# Patient Record
Sex: MALE | Race: WHITE | NOT HISPANIC OR LATINO | Employment: OTHER | URBAN - METROPOLITAN AREA
[De-identification: names, ages, dates, MRNs, and addresses within clinical notes are randomized per-mention and may not be internally consistent; named-entity substitution may affect disease eponyms.]

---

## 2019-11-22 ENCOUNTER — HOSPITAL ENCOUNTER (EMERGENCY)
Facility: CLINIC | Age: 74
Discharge: HOME | End: 2019-11-22
Attending: EMERGENCY MEDICINE | Admitting: EMERGENCY MEDICINE
Payer: COMMERCIAL

## 2019-11-22 ENCOUNTER — APPOINTMENT (OUTPATIENT)
Dept: GENERAL RADIOLOGY | Facility: CLINIC | Age: 74
End: 2019-11-22
Attending: EMERGENCY MEDICINE
Payer: COMMERCIAL

## 2019-11-22 VITALS
HEART RATE: 62 BPM | BODY MASS INDEX: 27.2 KG/M2 | HEIGHT: 70 IN | OXYGEN SATURATION: 98 % | WEIGHT: 190 LBS | SYSTOLIC BLOOD PRESSURE: 130 MMHG | RESPIRATION RATE: 18 BRPM | TEMPERATURE: 97 F | DIASTOLIC BLOOD PRESSURE: 83 MMHG

## 2019-11-22 DIAGNOSIS — K59.00 CONSTIPATION, UNSPECIFIED CONSTIPATION TYPE: ICD-10-CM

## 2019-11-22 LAB — HEMOCCULT STL QL: NEGATIVE

## 2019-11-22 PROCEDURE — 99284 EMERGENCY DEPT VISIT MOD MDM: CPT

## 2019-11-22 PROCEDURE — 74019 RADEX ABDOMEN 2 VIEWS: CPT

## 2019-11-22 PROCEDURE — 82272 OCCULT BLD FECES 1-3 TESTS: CPT | Performed by: EMERGENCY MEDICINE

## 2019-11-22 ASSESSMENT — MIFFLIN-ST. JEOR: SCORE: 1603.08

## 2019-11-22 ASSESSMENT — ENCOUNTER SYMPTOMS
VOMITING: 0
NAUSEA: 1
ABDOMINAL DISTENTION: 1
CONSTIPATION: 1
HEADACHES: 1
ABDOMINAL PAIN: 1
BACK PAIN: 1
FEVER: 0

## 2019-11-22 NOTE — ED PROVIDER NOTES
History     Chief Complaint:  Abdominal Pain    HPI   Elvis Abad is a 74 year old male who presents with abdominal pain. He reports that he started having right sided abdominal pain and back pain 5 days ago.  He states that he had a few small bowel movements during the day 3 and 4 days ago and was not concerned about constipation at that time. He denies blood in his stool but states that he noticed a red chunk about the size of his thumb that he thought was red pepper from some stir-sheriff he ate. He reports that he was reassured by his somewhat regular bowel movements and was more concerned about his gallbladder at this time. Per his chart, he visited his primary care provider 3 days ago and received a workup including labs and imaging and was referred for an abdominal ultrasound at [facility] yesterday (see results below).  He states that he has not had a bowel movement in the last 2 days but has been able to pass gas. He states that he takes Miralax daily and that this has not provided relief of his constipation. He states that he has experienced headache, nausea, abdominal bloating, and frequent belching. He denies vomiting and fever. He states that he has had a flare up of his hemorrhoids in the last few days. He denies being exposed to sick people.    Results from  on [facility] on 11/19/19  UA with Microscopic: All WNL    CMP: notable for sodium of 129 and potassium of 5.7    CBC: All WNL    XR Abdomen 1 View:   The bowel gas pattern is nonobstructive.  Extensive stool burden throughout large bowel, predominantly localized to the ascending colon.   No bowel wall thickening or abnormal displacement of bowel loops.   No pathologic abdominal calcifications.    Read as per radiology.       Results from [facility] on 11/21/19    BMP: notable for a sodium of 132    US Abdomen, limited:  1.  Pancreas obscured by bowel gas and not well visualized.  2.  Abdominal ultrasound is otherwise  "negative. No cholelithiasis.    Read as per radiology.     Allergies:  No known drug allergies    Medications:    Nexium  Miralax    Past Medical History:    Vitamin D deficiency  GERD  Environmental allergies    Past Surgical History:    Appendectomy    Family History:    No past pertinent family history.    Social History:  Smoking status: Never  Alcohol use: Not currently    Marital Status:      Review of Systems   Constitutional: Negative for fever.   Gastrointestinal: Positive for abdominal distention, abdominal pain, constipation and nausea. Negative for vomiting.   Musculoskeletal: Positive for back pain.   Neurological: Positive for headaches.   All other systems reviewed and are negative.    Physical Exam     Patient Vitals for the past 24 hrs:   BP Temp Temp src Pulse Resp SpO2 Height Weight   11/22/19 1205 -- -- -- -- -- 98 % -- --   11/22/19 1200 130/83 -- -- 62 -- 98 % -- --   11/22/19 1155 -- -- -- -- -- 97 % -- --   11/22/19 1150 -- -- -- -- -- 98 % -- --   11/22/19 1001 (!) 143/90 97  F (36.1  C) Temporal 66 18 99 % 1.778 m (5' 10\") 86.2 kg (190 lb)     Physical Exam  Constitutional: Vital signs reviewed as above.   HENT:               Head: No external signs of trauma noted.              Eyes: Conjunctivae are normal. Pupils are equal, round, and reactive to light.   Cardiovascular:               Normal rate, regular rhythm and normal heart sounds.                Exam reveals no friction rub.                No murmur heard.  Pulmonary/Chest:               Effort normal and breath sounds normal.               No respiratory distress.               There are no wheezes.               There are no rales.   Gastrointestinal:               Soft.               Bowel sounds normal.               There is no distension.               There is no focal tenderness on my exam.               There is no rebound or guarding.   Rectal               Normal tone              No gross blood              Brown " colored stool noted              No external hemorrhoids noted              No anal fissures noted              No stool palpated in rectal vault  Musculoskeletal:               Normal range of motion.               Normal Tone  Neurological: Patient is alert and oriented to person, place, and time.   Skin: Skin is warm and dry. Patient is not diaphoretic  Psychiatric: The patient appears calm    Emergency Department Course     Imaging:  Radiographic findings were communicated with the patient who voiced understanding of the findings.    XR Abdomen 2 Views:   Nonobstructive bowel gas pattern. Moderate stool and gas  in the colon. No free air.    Read as per radiology.     Laboratory:  Stool occult blood: negative    Emergency Department Course:  Nursing notes and vitals reviewed. (1033) I performed an exam of the patient as documented above.     Rectal swap collected. This was sent to the lab for further testing, results above.    The patient was sent for an abdominal x-ray while in the emergency department, findings above.     1152 I rechecked the patient and discussed the results of his workup thus far.     Findings and plan explained to the Patient. Patient discharged home with instructions regarding supportive care, medications, and reasons to return. The importance of close follow-up was reviewed. The patient was prescribed Magnesium Citrate solution    I personally reviewed the laboratory results with the Patient and answered all related questions prior to discharge.    Impression & Plan    Medical Decision Making:  This 74-year-old male patient presents to the ED due to abdominal pain and concerns for constipation.  Please see the HPI and exam for specifics.  The patient remained well in the ED.  X-ray today demonstrates less stool burden then is noted on the report from his prior x-ray.  After discussion with the patient he was willing to try oral magnesium citrate at home rather than going through with an  enema in the ED.  I did discuss the differential of abdominal pain with him which could include things like abdominal masses though based on the patient's exam I think that is unlikely.  I think that oral treatment for his symptoms over the weekend and follow-up with primary care on Monday is a reasonable plan.  Certainly if there are worsening or concerning symptoms further imaging such as a CT scan may be needed especially with the right upper quadrant area of his discomfort.  The patient should otherwise follow-up with a primary care clinic but certainly return to the ED if he feels worse.  Anticipatory guidance given prior to discharge.    Diagnosis:    ICD-10-CM    1. Constipation, unspecified constipation type K59.00        Disposition:  discharged to home    Discharge Medications:  New Prescriptions    MAGNESIUM CITRATE SOLUTION    Take 296 mLs by mouth once for 1 dose       Wilmar Vizcarra  11/22/2019   [facility] EMERGENCY DEPARTMENT  Scribe Disclosure:  Wilmar DHILLON, am serving as a scribe on 11/22/2019 at 10:33 AM to personally document services performed by Napoleon Bolton DO based on my observations and the provider's statements to me.      Napoleon Bolton DO  11/22/19 [id number]

## 2019-11-22 NOTE — ED TRIAGE NOTES
ABCs intact. Pt c/o abdominal pain since Sunday. Pt had labs/US this week. Last BM Wednesday. Denies urinary symptoms.

## 2019-11-22 NOTE — ED AVS SNAPSHOT
[facility] Emergency Department  [address]  [facility] [address]  Phone:  [phone]  Fax:  [phone]                                    Elvis Abad   MRN: [id number]    Department:  [facility] Emergency Department   Date of Visit:  11/22/2019           After Visit Summary Signature Page    I have received my discharge instructions, and my questions have been answered. I have discussed any challenges I see with this plan with the nurse or doctor.    ..........................................................................................................................................  Patient/Patient Representative Signature      ..........................................................................................................................................  Patient Representative Print Name and Relationship to Patient    ..................................................               ................................................  Date                                   Time    ..........................................................................................................................................  Reviewed by Signature/Title    ...................................................              ..............................................  Date                                               Time          22EPIC Rev 08/18

## 2019-11-22 NOTE — DISCHARGE INSTRUCTIONS
It is possible that your symptoms could be due to constipation.  Your exam and images today do not show any concerning findings for an intestinal obstruction or perforation.  I believe it is reasonable to try magnesium citrate at home.  This should help with constipation.  Please follow-up with your primary care clinic next week and discuss her symptoms.  If you still have persistent symptoms it may be reasonable to consider performing a CT scan of the abdomen to evaluate your area of discomfort.  Return to the ED should you experience fevers, persistent vomiting, worsening abdominal distention, or other concerning symptoms to you.

## 2021-06-01 ENCOUNTER — RECORDS - HEALTHEAST (OUTPATIENT)
Dept: ADMINISTRATIVE | Facility: CLINIC | Age: 76
End: 2021-06-01

## 2022-02-10 ENCOUNTER — MEDICAL CORRESPONDENCE (OUTPATIENT)
Dept: HEALTH INFORMATION MANAGEMENT | Facility: CLINIC | Age: 77
End: 2022-02-10

## 2022-02-11 DIAGNOSIS — Z11.59 ENCOUNTER FOR SCREENING FOR OTHER VIRAL DISEASES: Primary | ICD-10-CM

## 2022-04-11 ENCOUNTER — APPOINTMENT (OUTPATIENT)
Dept: MRI IMAGING | Facility: CLINIC | Age: 77
End: 2022-04-11
Attending: EMERGENCY MEDICINE
Payer: COMMERCIAL

## 2022-04-11 ENCOUNTER — HOSPITAL ENCOUNTER (EMERGENCY)
Facility: CLINIC | Age: 77
Discharge: HOME | End: 2022-04-11
Attending: EMERGENCY MEDICINE | Admitting: EMERGENCY MEDICINE
Payer: COMMERCIAL

## 2022-04-11 VITALS
HEART RATE: 60 BPM | SYSTOLIC BLOOD PRESSURE: 139 MMHG | RESPIRATION RATE: 18 BRPM | OXYGEN SATURATION: 96 % | DIASTOLIC BLOOD PRESSURE: 84 MMHG | TEMPERATURE: 97.2 F | HEIGHT: 70 IN | BODY MASS INDEX: 27.2 KG/M2 | WEIGHT: 190 LBS

## 2022-04-11 DIAGNOSIS — R47.01 APHASIA: ICD-10-CM

## 2022-04-11 LAB
ALBUMIN SERPL-MCNC: 3.6 G/DL (ref 3.4–5)
ALBUMIN UR-MCNC: NEGATIVE MG/DL
ALP SERPL-CCNC: 49 U/L (ref 40–150)
ALT SERPL W P-5'-P-CCNC: 25 U/L (ref 0–70)
ANION GAP SERPL CALCULATED.3IONS-SCNC: 4 MMOL/L (ref 3–14)
APPEARANCE UR: CLEAR
AST SERPL W P-5'-P-CCNC: 18 U/L (ref 0–45)
BASOPHILS # BLD AUTO: 0 10E3/UL (ref 0–0.2)
BASOPHILS NFR BLD AUTO: 1 %
BILIRUB SERPL-MCNC: 0.5 MG/DL (ref 0.2–1.3)
BILIRUB UR QL STRIP: NEGATIVE
BUN SERPL-MCNC: 14 MG/DL (ref 7–30)
CALCIUM SERPL-MCNC: 8.5 MG/DL (ref 8.5–10.1)
CHLORIDE BLD-SCNC: 97 MMOL/L (ref 94–109)
CHOLEST SERPL-MCNC: 172 MG/DL
CO2 SERPL-SCNC: 28 MMOL/L (ref 20–32)
COLOR UR AUTO: ABNORMAL
CREAT SERPL-MCNC: 0.96 MG/DL (ref 0.66–1.25)
EOSINOPHIL # BLD AUTO: 0.2 10E3/UL (ref 0–0.7)
EOSINOPHIL NFR BLD AUTO: 2 %
ERYTHROCYTE [DISTWIDTH] IN BLOOD BY AUTOMATED COUNT: 14.7 % (ref 10–15)
GFR SERPL CREATININE-BSD FRML MDRD: 81 ML/MIN/1.73M2
GLUCOSE BLD-MCNC: 90 MG/DL (ref 70–99)
GLUCOSE UR STRIP-MCNC: NEGATIVE MG/DL
HBA1C MFR BLD: 5.7 % (ref 0–5.6)
HCT VFR BLD AUTO: 42.2 % (ref 40–53)
HDLC SERPL-MCNC: 61 MG/DL
HGB BLD-MCNC: 13.6 G/DL (ref 13.3–17.7)
HGB UR QL STRIP: NEGATIVE
HOLD SPECIMEN: NORMAL
IMM GRANULOCYTES # BLD: 0 10E3/UL
IMM GRANULOCYTES NFR BLD: 0 %
KETONES UR STRIP-MCNC: NEGATIVE MG/DL
LDLC SERPL CALC-MCNC: 95 MG/DL
LEUKOCYTE ESTERASE UR QL STRIP: NEGATIVE
LYMPHOCYTES # BLD AUTO: 1.5 10E3/UL (ref 0.8–5.3)
LYMPHOCYTES NFR BLD AUTO: 23 %
MCH RBC QN AUTO: 30 PG (ref 26.5–33)
MCHC RBC AUTO-ENTMCNC: 32.2 G/DL (ref 31.5–36.5)
MCV RBC AUTO: 93 FL (ref 78–100)
MONOCYTES # BLD AUTO: 0.9 10E3/UL (ref 0–1.3)
MONOCYTES NFR BLD AUTO: 13 %
MUCOUS THREADS #/AREA URNS LPF: PRESENT /LPF
NEUTROPHILS # BLD AUTO: 4.1 10E3/UL (ref 1.6–8.3)
NEUTROPHILS NFR BLD AUTO: 61 %
NITRATE UR QL: NEGATIVE
NONHDLC SERPL-MCNC: 111 MG/DL
NRBC # BLD AUTO: 0 10E3/UL
NRBC BLD AUTO-RTO: 0 /100
PH UR STRIP: 7 [PH] (ref 5–7)
PLATELET # BLD AUTO: 242 10E3/UL (ref 150–450)
POTASSIUM BLD-SCNC: 4.8 MMOL/L (ref 3.4–5.3)
PROT SERPL-MCNC: 7 G/DL (ref 6.8–8.8)
RBC # BLD AUTO: 4.53 10E6/UL (ref 4.4–5.9)
RBC URINE: <1 /HPF
SODIUM SERPL-SCNC: 129 MMOL/L (ref 133–144)
SP GR UR STRIP: 1.01 (ref 1–1.03)
TRIGL SERPL-MCNC: 82 MG/DL
TROPONIN I SERPL HS-MCNC: 8 NG/L
UROBILINOGEN UR STRIP-MCNC: NORMAL MG/DL
WBC # BLD AUTO: 6.8 10E3/UL (ref 4–11)
WBC URINE: <1 /HPF

## 2022-04-11 PROCEDURE — 81001 URINALYSIS AUTO W/SCOPE: CPT | Performed by: EMERGENCY MEDICINE

## 2022-04-11 PROCEDURE — 36415 COLL VENOUS BLD VENIPUNCTURE: CPT | Performed by: EMERGENCY MEDICINE

## 2022-04-11 PROCEDURE — 250N000013 HC RX MED GY IP 250 OP 250 PS 637: Performed by: EMERGENCY MEDICINE

## 2022-04-11 PROCEDURE — 85025 COMPLETE CBC W/AUTO DIFF WBC: CPT | Performed by: EMERGENCY MEDICINE

## 2022-04-11 PROCEDURE — 83036 HEMOGLOBIN GLYCOSYLATED A1C: CPT | Performed by: STUDENT IN AN ORGANIZED HEALTH CARE EDUCATION/TRAINING PROGRAM

## 2022-04-11 PROCEDURE — 80061 LIPID PANEL: CPT | Performed by: STUDENT IN AN ORGANIZED HEALTH CARE EDUCATION/TRAINING PROGRAM

## 2022-04-11 PROCEDURE — 93005 ELECTROCARDIOGRAM TRACING: CPT

## 2022-04-11 PROCEDURE — A9585 GADOBUTROL INJECTION: HCPCS | Performed by: EMERGENCY MEDICINE

## 2022-04-11 PROCEDURE — 255N000002 HC RX 255 OP 636: Performed by: EMERGENCY MEDICINE

## 2022-04-11 PROCEDURE — 99207 PR NO CHARGE LOS: CPT | Performed by: STUDENT IN AN ORGANIZED HEALTH CARE EDUCATION/TRAINING PROGRAM

## 2022-04-11 PROCEDURE — 250N000011 HC RX IP 250 OP 636: Performed by: EMERGENCY MEDICINE

## 2022-04-11 PROCEDURE — 70544 MR ANGIOGRAPHY HEAD W/O DYE: CPT | Mod: XS

## 2022-04-11 PROCEDURE — 99285 EMERGENCY DEPT VISIT HI MDM: CPT | Mod: 25

## 2022-04-11 PROCEDURE — 84484 ASSAY OF TROPONIN QUANT: CPT | Performed by: EMERGENCY MEDICINE

## 2022-04-11 PROCEDURE — 96374 THER/PROPH/DIAG INJ IV PUSH: CPT | Mod: 59

## 2022-04-11 PROCEDURE — 70549 MR ANGIOGRAPH NECK W/O&W/DYE: CPT

## 2022-04-11 PROCEDURE — 70553 MRI BRAIN STEM W/O & W/DYE: CPT

## 2022-04-11 PROCEDURE — 80053 COMPREHEN METABOLIC PANEL: CPT | Performed by: EMERGENCY MEDICINE

## 2022-04-11 RX ORDER — CLOPIDOGREL BISULFATE 75 MG/1
75 TABLET ORAL DAILY
Qty: 21 TABLET | Refills: 0 | Status: SHIPPED | OUTPATIENT
Start: 2022-04-12 | End: 2022-06-02

## 2022-04-11 RX ORDER — GADOBUTROL 604.72 MG/ML
10 INJECTION INTRAVENOUS ONCE
Status: COMPLETED | OUTPATIENT
Start: 2022-04-11 | End: 2022-04-11

## 2022-04-11 RX ORDER — ONDANSETRON 2 MG/ML
4 INJECTION INTRAMUSCULAR; INTRAVENOUS ONCE
Status: COMPLETED | OUTPATIENT
Start: 2022-04-11 | End: 2022-04-11

## 2022-04-11 RX ORDER — CLOPIDOGREL 300 MG/1
300 TABLET, FILM COATED ORAL ONCE
Status: COMPLETED | OUTPATIENT
Start: 2022-04-11 | End: 2022-04-11

## 2022-04-11 RX ADMIN — GADOBUTROL 10 ML: 604.72 INJECTION INTRAVENOUS at 12:54

## 2022-04-11 RX ADMIN — ONDANSETRON 4 MG: 2 INJECTION INTRAMUSCULAR; INTRAVENOUS at 12:10

## 2022-04-11 RX ADMIN — CLOPIDOGREL BISULFATE 300 MG: 300 TABLET, FILM COATED ORAL at 16:19

## 2022-04-11 ASSESSMENT — ENCOUNTER SYMPTOMS
NUMBNESS: 0
SPEECH DIFFICULTY: 1
NAUSEA: 0
BACK PAIN: 0
WEAKNESS: 0
NECK PAIN: 0
FEVER: 0
VOMITING: 0
CONFUSION: 0
ABDOMINAL PAIN: 0
CHILLS: 0
DIARRHEA: 0
HEADACHES: 1

## 2022-04-11 NOTE — ED PROVIDER NOTES
"  History   Chief Complaint:  Headache    The history is provided by the patient.      Elvis Abad is a 76 year old male who presents with a headache after an episode of difficulty reading and formulating words, lasting acutely for a 15 minute period approximately 4 hours ago and with complete resolution of disruption since. The patient went to bed well last night and otherwise asymptomatic, but awoke this morning 4 hours ago with sudden inability to read his phone. He \"knew what it said but couldn't formulate words\" in his mind, also experiencing soreness to his left-sided face with accompanying left-sided headache. He called for his wife and she noted that he \"looked disturbed\" but was not showing other signs of confusion of slurred speech. This episode lasted for 15 minutes before complete resolution, and since then he has been able to read the newspaper and other text without difficulty. His headache however has persisted and concern over this episode prompted his arrival to ED today with his wife. Notably, the patient has experienced recent \"twitching\" to his left eye for the past 3 days with other intermittent \"black floaty\" in his left eye field of vision for the past few weeks. He has other history of similar visual disturbance, but has not followed up with his primary care provider for any of these concerns. He has no history of migraines or past stroke. The patient is otherwise well, with no current numbness or weakness, nausea, emesis, diarrhea, or abdominal pain. He has had no fever, chills, neck or back pain, or chest pain.    Review of Systems   Constitutional: Negative for chills and fever.   Cardiovascular: Negative for chest pain.   Gastrointestinal: Negative for abdominal pain, diarrhea, nausea and vomiting.   Musculoskeletal: Negative for back pain and neck pain.   Neurological: Positive for speech difficulty and headaches. Negative for weakness and numbness.        (+) difficulty reading " "  Psychiatric/Behavioral: Negative for confusion.   All other systems reviewed and are negative.      Allergies:  No known drug allergies    Medications:  Diphenhydramine  Cholecalciferol  Naproxen  Esomeprazole    Past Medical History:     Vitamin D deficiency  GERD  Purulent bronchitis  Seasonal allergies    Past Surgical History:    Appendectomy  Right shoulder surgery    Social History:  The patient presented with his wife.  The patient arrived in a private vehicle.     Physical Exam     Patient Vitals for the past 24 hrs:   BP Temp Pulse Resp SpO2 Height Weight   04/11/22 1530 139/84 -- 60 18 96 % -- --   04/11/22 1500 (!) 145/83 -- 56 18 98 % -- --   04/11/22 1410 (!) 151/107 -- 63 -- 98 % -- --   04/11/22 1130 (!) 150/97 -- -- 18 96 % -- --   04/11/22 1127 (!) 148/82 97.2  F (36.2  C) 66 20 96 % 1.778 m (5' 10\") 86.2 kg (190 lb)     Physical Exam  Constitutional: Well appearing.  HEENT: Atraumatic.  PERRL.  EOMI.  Moist mucous membranes.  Neck: Soft.  Supple.   Cardiac: Regular rate and rhythm.  No murmur or rub.  Respiratory: Clear to auscultation bilaterally.  No respiratory distress.   Abdomen: Soft and nontender.  Nondistended.  Musculoskeletal: No edema.  Normal range of motion.  Neurologic: Alert and oriented x3.  Normal tone and bulk.  Cranial nerves II through XII intact.  5/5 strength in bilateral upper and lower extremities.  Sensation to light touch intact throughout.  Normal gait.  Skin: No rashes.  No edema.  Psych: Normal affect.  Normal behavior.      Emergency Department Course     ECG  ECG obtained at 1202, ECG read at 1211  Sinus bradycardia.  Otherwise normal ECG.   No significant change as compared to prior, dated 8/1/9.  Rate 59 bpm. MN interval 142 ms. QRS duration 98 ms. QT/QTc 410/405 ms. P-R-T axes 8 23 38.     Imaging:  MRA Neck (Carotids) wo & w Contrast   Final Result   IMPRESSION:    1.  No evidence for dissection or hemodynamically significant   narrowing in the neck based on " NASCET criteria.      SUZANNA BLAIR MD            SYSTEM ID:  [id number]      MRA Brain ([place]) wo Contrast   Final Result   IMPRESSION:    1.  No significant intracranial stenosis. No aneurysm and no high flow   vascular malformation.      SUZANNA BLAIR MD            SYSTEM ID:  [id number]      MR Brain w/o & w Contrast   Final Result   IMPRESSION: Mild age-related changes as above with no acute   intracranial abnormality. No finding for acute infarct, intracranial   hemorrhage, or abnormally enhancing mass.      SUZANNA BLAIR MD            SYSTEM ID:  [id number]      Cardiac Event Monitor Adult Pediatric    (Results Pending)   Echocardiogram Complete - For age > 60 years    (Results Pending)     Report per radiology    Laboratory:  Labs Ordered and Resulted from Time of ED Arrival to Time of ED Departure   COMPREHENSIVE METABOLIC PANEL - Abnormal       Result Value    Sodium 129 (*)     Potassium 4.8      Chloride 97      Carbon Dioxide (CO2) 28      Anion Gap 4      Urea Nitrogen 14      Creatinine 0.96      Calcium 8.5      Glucose 90      Alkaline Phosphatase 49      AST 18      ALT 25      Protein Total 7.0      Albumin 3.6      Bilirubin Total 0.5      GFR Estimate 81     ROUTINE UA WITH MICROSCOPIC REFLEX TO CULTURE - Abnormal    Color Urine Light Yellow      Appearance Urine Clear      Glucose Urine Negative      Bilirubin Urine Negative      Ketones Urine Negative      Specific Gravity Urine 1.008      Blood Urine Negative      pH Urine 7.0      Protein Albumin Urine Negative      Urobilinogen Urine Normal      Nitrite Urine Negative      Leukocyte Esterase Urine Negative      Mucus Urine Present (*)     RBC Urine <1      WBC Urine <1     TROPONIN I - Normal    Troponin I High Sensitivity 8     CBC WITH PLATELETS AND DIFFERENTIAL    WBC Count 6.8      RBC Count 4.53      Hemoglobin 13.6      Hematocrit 42.2      MCV 93      MCH 30.0      MCHC 32.2      RDW 14.7      Platelet Count 242      %  Neutrophils 61      % Lymphocytes 23      % Monocytes 13      % Eosinophils 2      % Basophils 1      % Immature Granulocytes 0      NRBCs per 100 WBC 0      Absolute Neutrophils 4.1      Absolute Lymphocytes 1.5      Absolute Monocytes 0.9      Absolute Eosinophils 0.2      Absolute Basophils 0.0      Absolute Immature Granulocytes 0.0      Absolute NRBCs 0.0     HEMOGLOBIN A1C   LIPID REFLEX TO DIRECT LDL PANEL     Emergency Department Course:     Reviewed:  I reviewed nursing notes, vitals, past medical history and Care Everywhere.    Assessments:  1139 I obtained history and examined the patient as noted above.   1318 I rechecked the patient and explained findings.  1555 I rechecked the patient and explained findings. I believe that they are safe for discharge at this time.    Consults:  1544 I spoke with stroke neurology. They believe he is safe for discharge to home with follow-up in TIA clinic.    Interventions:  1210 Zofran 4 mg IV  1254 Gadavist 10 mL IV    Disposition:  The patient was discharged to home.     Impression & Plan     Medical Decision Making:  Elvis Abad is a 76-year-old man who is afebrile and hemodynamically stable.  He is neurologically intact right now with no focal deficits.  He had approximately 10 to 15 minutes of symptoms that have resolved and not recurred.  Lab work-up as noted as above.  I discussed an MRI and he is in agreement.  MRI was performed as above with no acute abnormality noted.  I discussed the results with him.  I spoke with stroke neurology about TIA follow-up and they are in agreement with TIA follow-up pathway.  We will start him on aspirin and Plavix.  We discussed plan for outpatient follow-up in the TIA clinic and initiation of aspirin Plavix and patient and wife are in agreement.  The questions were answered.  Discussed supportive care at home and strict return precautions were given.  The questions were answered and he was in no distress at time of  discharge.      Diagnosis:    ICD-10-CM    1. Aphasia  R47.01 TIA Follow-Up     Cardiac Event Monitor Adult Pediatric     Echocardiogram Complete - For age > 60 years     aspirin (ASA) 325 MG EC tablet     clopidogrel (PLAVIX) 75 MG tablet     Discharge Medications:  New Prescriptions    ASPIRIN (ASA) 325 MG EC TABLET    Take 1 tablet (325 mg) by mouth in the morning. DO NOT CRUSH.    CLOPIDOGREL (PLAVIX) 75 MG TABLET    Take 1 tablet (75 mg) by mouth in the morning for 21 days.     Scribe Disclosure:  Afshan DHILLON, am serving as a scribe at 11:31 AM on 4/11/2022 to document services personally performed by Lonny Matthews MD based on my observations and the provider's statements to me.     Lonny Matthews MD  04/15/22 [id number]

## 2022-04-11 NOTE — CONSULTS
[facility]    Stroke Telephone Note    I was called by Lonny Matthews on 04/11/22 regarding patient Elvis Abad. The patient is a 76 year old male with no significant PMH. Patient presents after an episode of transient difficulty with comprehension. He noted he was unable to comprehend what he was reading for about 15 minutes and describes that his visual ability was still intact. In ed patient is symptom free and denies weakness, numbness, vision changes and ongoing comprehension deficit.    Imaging Findings   Results for orders placed or performed during the hospital encounter of 04/11/22   MR Brain w/o & w Contrast    Impression    IMPRESSION: Mild age-related changes as above with no acute  intracranial abnormality. No finding for acute infarct, intracranial  hemorrhage, or abnormally enhancing mass.    SUZANNA BLAIR MD         SYSTEM ID:  [id number]   MRA Neck (Carotids) wo & w Contrast    Impression    IMPRESSION:   1.  No evidence for dissection or hemodynamically significant  narrowing in the neck based on NASCET criteria.    SUZANNA BLAIR MD         SYSTEM ID:  [id number]   MRA Brain ([place]) wo Contrast    Impression    IMPRESSION:   1.  No significant intracranial stenosis. No aneurysm and no high flow  vascular malformation.    SUZANNA BLAIR MD         SYSTEM ID:  [id number]          Impression  Transient ischemic attack    Recommendations   - TIA Pathway  - Permissive HTN; goal SBP: Normotension  - Daily aspirin 325 mg for secondary stroke prevention  - Plavix 300mg load  - Plavix 75mg for 21 days   - Not previously on antiplatelet agent  - Statin: Atorvastatin 80mg  - Telemetry, EKG  - A1c, Lipid Panel  - Stroke Education  - Echocardiogram, TTE  - Heart monitor on discharge     My recommendations are based on the information provided over the phone by Elvis Abad's in-person providers. They are not intended to replace the clinical judgment of his  "in-person providers. I was not requested to personally see or examine the patient at this time.    The Stroke Staff is Dr. Castillo.    Kike Khanna MD  Vascular Neurology Fellow  To page me or covering stroke neurology team member, click here: AMCOM   Choose \"On Call\" tab at top, then search dropdown box for \"Neurology Adult\", select location, press Enter, then look for stroke/neuro ICU/telestroke.           "

## 2022-04-12 ENCOUNTER — VIRTUAL VISIT (OUTPATIENT)
Dept: NEUROLOGY | Facility: CLINIC | Age: 77
End: 2022-04-12
Payer: COMMERCIAL

## 2022-04-12 ENCOUNTER — TELEPHONE (OUTPATIENT)
Dept: NEUROLOGY | Facility: CLINIC | Age: 77
End: 2022-04-12
Payer: COMMERCIAL

## 2022-04-12 DIAGNOSIS — R47.01 APHASIA: ICD-10-CM

## 2022-04-12 DIAGNOSIS — R51.9 NEW ONSET HEADACHE: Primary | ICD-10-CM

## 2022-04-12 LAB
ATRIAL RATE - MUSE: 59 BPM
DIASTOLIC BLOOD PRESSURE - MUSE: NORMAL MMHG
INTERPRETATION ECG - MUSE: NORMAL
P AXIS - MUSE: 8 DEGREES
PR INTERVAL - MUSE: 142 MS
QRS DURATION - MUSE: 98 MS
QT - MUSE: 410 MS
QTC - MUSE: 405 MS
R AXIS - MUSE: 23 DEGREES
SYSTOLIC BLOOD PRESSURE - MUSE: NORMAL MMHG
T AXIS - MUSE: 38 DEGREES
VENTRICULAR RATE- MUSE: 59 BPM

## 2022-04-12 PROCEDURE — 99204 OFFICE O/P NEW MOD 45 MIN: CPT | Mod: 95 | Performed by: PHYSICIAN ASSISTANT

## 2022-04-12 RX ORDER — ACETAMINOPHEN/DIPHENHYDRAMINE 500MG-25MG
TABLET ORAL
COMMUNITY
Start: 2021-09-27 | End: 2022-10-03

## 2022-04-12 RX ORDER — ESOMEPRAZOLE MAGNESIUM 40 MG/1
40 CAPSULE, DELAYED RELEASE ORAL 2 TIMES DAILY
COMMUNITY
Start: 2021-09-27

## 2022-04-12 NOTE — NURSING NOTE
Medication list reviewed, reports taking Multivitamin, Tylenol as needed, Benadryl as needed and allergy medications Zyrtec.    FLOR ARAUZ, CMA

## 2022-04-12 NOTE — TELEPHONE ENCOUNTER
ED TIA Pathway patient. Chart reviewed    Neurology phone note while in the hospital: YES    Head CT or Brain MRI completed:YES    Head and Neck Vessel Imaging completed (MRA Head/Neck or CTA Head/Neck):YES    Stroke [facility] Virtual appointment Next available 4/19/22, routing to Stroke [facility] and [facility] to advise.     Echocardiogram and Cardiac Event Monitor   Scheduled for 4/15/22    FLOR ARAUZ CMA

## 2022-04-12 NOTE — PROGRESS NOTES
"Elvis is a 76 year old who is being evaluated via a billable video visit.      How would you like to obtain your AVS? Mail a copy  If the video visit is dropped, the invitation should be resent by: Send to e-mail at: [email or web address]  Will anyone else be joining your video visit? No      Video Start Time: 1503  Video-Visit Details    Type of service:  Video Visit    Video End Time:3:37 PM    Originating Location (pt. Location): Home    Distant Location (provider location):  [facility] NEUROLOGY [facility]     Platform used for Video Visit: [employer]    __________________________________________________________      [facility] [place] [facility]    [phone]  __________________________________________________________    Chief Complaint: Patient presents with:  Transient Ischemic Attack      History of Present Illness: Elvis Abad is a 76 year old male presenting as a new patient to TIA clinic. He presented to the [facility] emergency department on 4/11/22 (yesterday) because of transient difficulty with comprehension.    He reports that he was in his bed early in the morning, trying to read the news on his phone, happened for a duration of about 10 minutes. Felt exhausted.   \"Couldn't formulate the words in my mind\". He then got out of bed and made coffee, and then was able to read on tablet.  Felt some pressure in his head and eye twiching. L face felt sore. BP in the ED was 148/82 initially.     For the  Past month or so, he has what is essentially a new headache pattern for him.  Headache on and off at night when going to bed.  He takes benadryl and tylenol at night when going to bed and it goes away. That has been going on for a month. About 3/7 nights. By the time he wakes up it's gone.  Left eye twitching is new this month as well, and is fairly consistent.      He reports a history of sinus headache that feels like pressure, probably more on the left.  " "    He has a past medical history of GERD, arthritis, season allergies.    TIA Evaluation Summarized  MRI and/or Head CT: MRI brain no acute finding  Intracranial Vascular Imaging: MRA head no stenosis  Cervical Carotid and Vertebral Artery Vascular Imaging: MRA neck no stenosis  Echocardiogram: not yet done*  EKG/Telemetry: sinus bradycardia  LDL: 95  A1c: 5.7  Other testin day monitor not yet done*    ABCD2 Patients Score   Age ? 60 years 1 point 1   Blood Pressure     -SBP ? 140 or DBP ?  90    1 point 1   Clinical Features    -Unilateral weakness   -Speech disturbance w/o weakness    -Other    2 points  1 point    0 points 1   Duration of symptoms    ?60 minutes    10-59 minutes    <10 minutes   2 points  1 point  0 points 1   Diabetes  1 point 0   Patient s ABCD2 Score (0-7) = 4     He was started on aspirin and Plavix for recurrent stroke prevention. Since the above-mentioned ER visit, he still has dull headache on the left side, with left eye twitching. No prior history of any headaches but has occasional \"sinus pressure\".  Has a history of floaters. Has one in left eye now.  Like a small black fly in vision.  There is some movement to it when he moves his eyes.  No other visual changes.  Right now very mild pressure L head, L face even felt sore yesterday. L side neck discomfort.       Impression:   Problem List Items Addressed This Visit    None     Visit Diagnoses     New onset headache    -  Primary    Aphasia            76 year old man with a breif period of inability to read, concerning for TIA.  He also has a new headache within the past month that is different for him, and it's unclear if this is related to the TIA    Plan:   - Await echo and 30 day monitor   - Defer statin due to lack of athero seen on CTA head/neck and LDL already below 100  - Continue aspirin and Plavix together for 21 days, then aspirin 325mg daily alone indefinitely. Switch omeprazole to protonix while on Plavix  - Keep a " "headache diary, consider general neuro eval for further workup if persistent or worsening  - Return in about 7 weeks (around 2022) for Follow up, with any stroke SHERIDAN, using a video visit, (30-min visit).     Case discussed with Dr. Marci Castillo    Stroke Education provided.  He will call us with any questions.  For any acute neurologic deficits he was advised to  go directly to the hospital rather than call the clinic.    Mamie Rojas PA-C  Neurology  2022 3:18 PM  To page me or covering stroke neurology team member, click here: AMCOM  Choose \"On Call\" tab at top, then search dropdown box for \"Neurology Adult\" & press Enter, look for Neuro ICU/Stroke    ___________________________________________________________________    Current Medications  Current Outpatient Medications   Medication Sig     aspirin (ASA) 325 MG EC tablet Take 1 tablet (325 mg) by mouth in the morning. DO NOT CRUSH.     cholecalciferol 50 MCG (2000 UT) tablet      clopidogrel (PLAVIX) 75 MG tablet Take 1 tablet (75 mg) by mouth in the morning for 21 days.     esomeprazole (NEXIUM) 40 MG DR capsule Take 40 mg by mouth     Glucos-Chond-Sterol-Fish Oil (GLUCOSAMINE CHONDROITIN PLUS) CAPS      No current facility-administered medications for this visit.       Past Medical History  No past medical history on file.    Social History  Social History     Tobacco Use     Smoking status: Former Smoker     Types: Cigarettes     Quit date: 1980     Years since quittin.0     Smokeless tobacco: Never Used       Family History  No family history on file.    Physical Exam    Vitals - Patient Reported 2022   Weight (Patient Reported) 194 lb       General:  no acute distress  HEENT:  normocephalic/atraumatic  Pulmonary:  no respiratory distress    Neurologic  Mental Status:  alert, oriented x 3, follows commands, speech clear and fluent  Cranial Nerves:  EOMI with normal smooth pursuit, facial movements symmetric, hearing not formally " tested but intact to conversation, no dysarthria, shoulder shrug equal bilaterally, tongue protrusion midline  Motor:  no abnormal movements, able to move arms antigravity spontaneously with no signs of hemiparesis observed, no pronator drift  Reflexes:  unable to test (telestroke)  Sensory:  unable to test (telestroke)  Coordination:  normal finger-to-nose  bilaterally without dysmetria, rapid alternating movements symmetric  Station/Gait:  unable to test (telestroke)    Neuroimaging: as per HPI. I personally reviewed those images    Labs:    Coagulation studies:  No lab results found.     Lipid panel:  Recent Labs   Lab Test 04/11/22  1200 09/12/14  1011   CHOL 172 187   HDL 61 54   LDL 95 112   TRIG 82 104       HbA1C:  Recent Labs   Lab Test 04/11/22  1200   A1C 5.7*         Billing:    I spent a total of 55 minutes on the day of the visit.   Time spent doing chart review, history and exam, documentation and further activities per the note

## 2022-04-12 NOTE — LETTER
"    4/12/2022         RE: Elvis Abad  [address] [address] [phone]        Dear Colleague,    Thank you for referring your patient, Elvis Abad, to the [facility] NEUROLOGY [facility]. Please see a copy of my visit note below.    Elvis is a 76 year old who is being evaluated via a billable video visit.      How would you like to obtain your AVS? Mail a copy  If the video visit is dropped, the invitation should be resent by: Send to e-mail at: [email or web address]  Will anyone else be joining your video visit? No      Video Start Time: 1503  Video-Visit Details    Type of service:  Video Visit    Video End Time:3:37 PM    Originating Location (pt. Location): Home    Distant Location (provider location):  [facility] NEUROLOGY [facility]     Platform used for Video Visit: [employer]    __________________________________________________________      [facility]    [phone]  __________________________________________________________    Chief Complaint: Patient presents with:  Transient Ischemic Attack      History of Present Illness: Elvis Abad is a 76 year old male presenting as a new patient to TIA clinic. He presented to the [facility] emergency department on 4/11/22 (yesterday) because of transient difficulty with comprehension.    He reports that he was in his bed early in the morning, trying to read the news on his phone, happened for a duration of about 10 minutes. Felt exhausted.   \"Couldn't formulate the words in my mind\". He then got out of bed and made coffee, and then was able to read on tablet.  Felt some pressure in his head and eye twiching. L face felt sore. BP in the ED was 148/82 initially.     For the  Past month or so, he has what is essentially a new headache pattern for him.  Headache on and off at night when going to bed.  He takes benadryl and tylenol at night when going to bed and it goes away. " "That has been going on for a month. About 3/7 nights. By the time he wakes up it's gone.  Left eye twitching is new this month as well, and is fairly consistent.      He reports a history of sinus headache that feels like pressure, probably more on the left.      He has a past medical history of GERD, arthritis, season allergies.    TIA Evaluation Summarized  MRI and/or Head CT: MRI brain no acute finding  Intracranial Vascular Imaging: MRA head no stenosis  Cervical Carotid and Vertebral Artery Vascular Imaging: MRA neck no stenosis  Echocardiogram: not yet done*  EKG/Telemetry: sinus bradycardia  LDL: 95  A1c: 5.7  Other testin day monitor not yet done*    ABCD2 Patients Score   Age ? 60 years 1 point 1   Blood Pressure     -SBP ? 140 or DBP ?  90    1 point 1   Clinical Features    -Unilateral weakness   -Speech disturbance w/o weakness    -Other    2 points  1 point    0 points 1   Duration of symptoms    ?60 minutes    10-59 minutes    <10 minutes   2 points  1 point  0 points 1   Diabetes  1 point 0   Patient s ABCD2 Score (0-7) = 4     He was started on aspirin and Plavix for recurrent stroke prevention. Since the above-mentioned ER visit, he still has dull headache on the left side, with left eye twitching. No prior history of any headaches but has occasional \"sinus pressure\".  Has a history of floaters. Has one in left eye now.  Like a small black fly in vision.  There is some movement to it when he moves his eyes.  No other visual changes.  Right now very mild pressure L head, L face even felt sore yesterday. L side neck discomfort.       Impression:   Problem List Items Addressed This Visit    None     Visit Diagnoses     New onset headache    -  Primary    Aphasia            76 year old man with a breif period of inability to read, concerning for TIA.  He also has a new headache within the past month that is different for him, and it's unclear if this is related to the TIA    Plan:   - Await echo " "and 30 day monitor   - Defer statin due to lack of athero seen on CTA head/neck and LDL already below 100  - Continue aspirin and Plavix together for 21 days, then aspirin 325mg daily alone indefinitely. Switch omeprazole to protonix while on Plavix  - Keep a headache diary, consider general neuro eval for further workup if persistent or worsening  - Return in about 7 weeks (around 2022) for Follow up, with any stroke SHERIDAN, using a video visit, (30-min visit).     Case discussed with Dr. Marci Castillo    Stroke Education provided.  He will call us with any questions.  For any acute neurologic deficits he was advised to  go directly to the hospital rather than call the clinic.    Mamie Rojas PA-C  Neurology  2022 3:18 PM  To page me or covering stroke neurology team member, click here: AMCOM  Choose \"On Call\" tab at top, then search dropdown box for \"Neurology Adult\" & press Enter, look for Neuro ICU/Stroke    ___________________________________________________________________    Current Medications  Current Outpatient Medications   Medication Sig     aspirin (ASA) 325 MG EC tablet Take 1 tablet (325 mg) by mouth in the morning. DO NOT CRUSH.     cholecalciferol 50 MCG (2000 UT) tablet      clopidogrel (PLAVIX) 75 MG tablet Take 1 tablet (75 mg) by mouth in the morning for 21 days.     esomeprazole (NEXIUM) 40 MG DR capsule Take 40 mg by mouth     Glucos-Chond-Sterol-Fish Oil (GLUCOSAMINE CHONDROITIN PLUS) CAPS      No current facility-administered medications for this visit.       Past Medical History  No past medical history on file.    Social History  Social History     Tobacco Use     Smoking status: Former Smoker     Types: Cigarettes     Quit date: 1980     Years since quittin.0     Smokeless tobacco: Never Used       Family History  No family history on file.    Physical Exam    Vitals - Patient Reported 2022   Weight (Patient Reported) 194 lb       General:  no acute distress  HEENT: "  normocephalic/atraumatic  Pulmonary:  no respiratory distress    Neurologic  Mental Status:  alert, oriented x 3, follows commands, speech clear and fluent  Cranial Nerves:  EOMI with normal smooth pursuit, facial movements symmetric, hearing not formally tested but intact to conversation, no dysarthria, shoulder shrug equal bilaterally, tongue protrusion midline  Motor:  no abnormal movements, able to move arms antigravity spontaneously with no signs of hemiparesis observed, no pronator drift  Reflexes:  unable to test (telestroke)  Sensory:  unable to test (telestroke)  Coordination:  normal finger-to-nose  bilaterally without dysmetria, rapid alternating movements symmetric  Station/Gait:  unable to test (telestroke)    Neuroimaging: as per HPI. I personally reviewed those images    Labs:    Coagulation studies:  No lab results found.     Lipid panel:  Recent Labs   Lab Test 04/11/22  1200 09/12/14  1011   CHOL 172 187   HDL 61 54   LDL 95 112   TRIG 82 104       HbA1C:  Recent Labs   Lab Test 04/11/22  1200   A1C 5.7*         Billing:    I spent a total of 55 minutes on the day of the visit.   Time spent doing chart review, history and exam, documentation and further activities per the note            Again, thank you for allowing me to participate in the care of your patient.        Sincerely,        Mamie Rojas PA-C

## 2022-04-15 ENCOUNTER — HOSPITAL ENCOUNTER (OUTPATIENT)
Dept: CARDIOLOGY | Facility: CLINIC | Age: 77
Discharge: HOME | End: 2022-04-15
Admitting: INTERNAL MEDICINE
Payer: COMMERCIAL

## 2022-04-15 DIAGNOSIS — R47.01 APHASIA: ICD-10-CM

## 2022-04-15 LAB — LVEF ECHO: NORMAL

## 2022-04-15 PROCEDURE — 93270 REMOTE 30 DAY ECG REV/REPORT: CPT

## 2022-04-15 PROCEDURE — 93306 TTE W/DOPPLER COMPLETE: CPT

## 2022-04-15 PROCEDURE — 93272 ECG/REVIEW INTERPRET ONLY: CPT | Performed by: INTERNAL MEDICINE

## 2022-04-15 PROCEDURE — 93306 TTE W/DOPPLER COMPLETE: CPT | Mod: 26 | Performed by: INTERNAL MEDICINE

## 2022-04-22 ENCOUNTER — TELEPHONE (OUTPATIENT)
Dept: NEUROLOGY | Facility: CLINIC | Age: 77
End: 2022-04-22
Payer: COMMERCIAL

## 2022-04-22 NOTE — TELEPHONE ENCOUNTER
Per checkout report,   Return in about 7 weeks (around 5/31/2022) for Follow up, with any stroke SHERIDAN, using a video visit, (30-min visit).

## 2022-06-01 NOTE — PROGRESS NOTES
"    __________________________________________________________      [facility]   [phone]  __________________________________________________________    Chief Complaint: Patient presents with:  Stroke: Video visit      History of Present Illness: Elvis Abad is a 77 year old male presenting for follow-up for TIA. He has a past medical history of GERD, arthritis, season allergies.    He presented to the [facility] emergency department on 4/11/22 because of transient difficulty with comprehension. He reports that he was in his bed early in the morning, trying to read the news on his phone, happened for a duration of about 10 minutes. Felt exhausted.  \"Couldn't formulate the words in my mind\". He then got out of bed and made coffee, and then was able to read on tablet.  Felt some pressure in his head and eye twiching. L face felt sore. BP in the ED was 148/82 initially. He also reported a new pattern of headache 3 nights per week, generally resolved by waking in the morning.     TIA Evaluation Summarized  MRI and/or Head CT: MRI brain no acute finding  Intracranial Vascular Imaging: MRA head no stenosis  Cervical Carotid and Vertebral Artery Vascular Imaging: MRA neck no stenosis  Echocardiogram: LVEF 55-60%, no regional wall motion abnormalities, left atrium mildly dilated   EKG/Telemetry: sinus bradycardia  LDL: 95  A1c: 5.7  30 day monitor: SR with occasional PAC, PVC; no report of atrial fibrillation           ABCD2 Patients Score   Age ? 60 years 1 point 1   Blood Pressure     -SBP ? 140 or DBP ?  90     1 point 1   Clinical Features    -Unilateral weakness   -Speech disturbance w/o weakness    -Other    2 points  1 point     0 points 1   Duration of symptoms    ?60 minutes    10-59 minutes    <10 minutes    2 points  1 point  0 points 1   Diabetes  1 point 0   Patient s ABCD2 Score (0-7) = 4     He was started on aspirin and Plavix for recurrent stroke " "prevention.    He was seen by ALFREDO Rojas for TIA clinic 4/12/2022. At this time, he endorsed an ongoing dull left sided headache with left eye twitching. Given concern for possible TIA, it was recommended that he complete full workup with 30 day cardiac event monitor and echocardiogram. ASA/Plavix were continued x21 days, with plans to then stop Plavix and continue ASA 325mg daily alone. Statin was deferred due to lack of atherosclerotic disease and LDL <100.     Today Saulo reports things are \"really good.\"  He denies any recurrent epsiodes of speech difficulties, or any other focal neurological symptoms such as numbness, weakness or visual disturbance. He is taking  mg daily with no clear concern. He wonders if this would start to cause side effect if he could take a baby aspirin. He has not been checking his BP regularly, but does report \"it is always normal when I do get it checked.\" He is no longer having difficulty with headaches. He denies other question or concern at this time.     Modified Marsing Scale  Score: 0-No symptoms    Impression:   Problem List Items Addressed This Visit    None     Visit Diagnoses     Aphasia    -  Yaz Vernon is a 77 year old man who presents to followup on an episode of difficulty reading, concerning for TIA. TIA workup has been unrevealing and he has had no recurrent events.     Plan:   - continue ASA 325mg indefinitely for TIA/stroke prevention (would be reasonable to reduce to ASA 81mg should he develop gastric symptoms or other side effects)  - discussed the importance of long term BP management in regards to optimizing brain, heart and vessel health. I would suggest he monitor his BP, at least a couple of times per month to assess for any upward trends. BP goal is <140/90 with tighter control associated with improved cardiovascular outcomes   - discussed role of physical activity and diet in stroke prevention  -Signs of TIA/stroke reviewed, with patient " "instructed to call 911 for any subsequent stroke/TIA symptoms  - Return for Follow up, as needed.    Stroke Education provided.  He will call us with any questions.  For any acute neurologic deficits he was advised to  go directly to the hospital rather than call the clinic.    Juanita COLE, CNP  Vascular Neurology  To page me or covering stroke neurology team member, click here: AMCOM   Choose \"On Call\" tab at top, then search dropdown box for \"Neurology Adult\", select location, press Enter, then look for stroke/neuro ICU/telestroke.      ___________________________________________________________________    Current Medications  Current Outpatient Medications   Medication Sig     cholecalciferol 50 MCG (2000 UT) tablet      esomeprazole (NEXIUM) 40 MG DR capsule Take 40 mg by mouth     Glucos-Chond-Sterol-Fish Oil (GLUCOSAMINE CHONDROITIN PLUS) CAPS      No current facility-administered medications for this visit.       Past Medical History  No past medical history on file.    Social History  Social History     Tobacco Use     Smoking status: Former Smoker     Types: Cigarettes     Quit date: 1980     Years since quittin.1     Smokeless tobacco: Never Used       Family History  No family history on file.    Physical Exam    Vitals - Patient Reported 2022   Weight (Patient Reported) 194 lb             Estimated body mass index is 27.26 kg/m  as calculated from the following:    Height as of 22: 1.778 m (5' 10\").    Weight as of 22: 86.2 kg (190 lb).      General:  no acute distress  HEENT:  normocephalic/atraumatic  Pulmonary:  no respiratory distress    Neurologic  Mental Status:  alert, oriented x 3, follows commands, speech clear and fluent, naming and repetition normal  Cranial Nerves:  EOMI with normal smooth pursuit, facial movements symmetric, hearing mildly reduced to conversation, no dysarthria, shoulder shrug equal bilaterally,  Motor:  no abnormal movements, able to move all " limbs antigravity spontaneously with no signs of hemiparesis observed  Reflexes:  unable to test (telestroke)  Sensory:  unable to test (telestroke)  Station/Gait:  unable to test (telestroke)    Neuroimaging: as per HPI. I personally reviewed those images    Labs:    Coagulation studies:  No lab results found.     Lipid panel:  Recent Labs   Lab Test 04/11/22  1200 09/12/14  1011   CHOL 172 187   HDL 61 54   LDL 95 112   TRIG 82 104       HbA1C:  Recent Labs   Lab Test 04/11/22  1200   A1C 5.7*       Troponin:  Recent Labs   Lab Test 04/11/22  1200   TROPONINIS 8     No lab results found.  No lab results found.    Billing:  Video start time: 1000  Video end time: 1014  I spent a total of 30 minutes on the day of the visit.   Time spent doing chart review, history and exam, documentation and further activities per the note

## 2022-06-03 ENCOUNTER — VIRTUAL VISIT (OUTPATIENT)
Dept: NEUROLOGY | Facility: CLINIC | Age: 77
End: 2022-06-03
Payer: COMMERCIAL

## 2022-06-03 DIAGNOSIS — R47.01 APHASIA: Primary | ICD-10-CM

## 2022-06-03 PROCEDURE — 99214 OFFICE O/P EST MOD 30 MIN: CPT | Mod: 95 | Performed by: NURSE PRACTITIONER

## 2022-06-03 NOTE — NURSING NOTE
Patient denies any changes since echeck-in regarding medication and allergies.Patient also states all information entered during echeck-in remains accurate.    Patient states they are in [place] for today's virtual visit.     Annel Reyna, Virtual Visit [facility]

## 2022-06-03 NOTE — LETTER
"    6/3/2022         RE: Elvis Abad  [address] [facility] [address]        Dear Colleague,    Thank you for referring your patient, Elvis Abad, to the [facility] NEUROLOGY [facility]. Please see a copy of my visit note below.        __________________________________________________________      [facility]   [phone]  __________________________________________________________    Chief Complaint: Patient presents with:  Stroke: Video visit      History of Present Illness: Elvis Abad is a 77 year old male presenting for follow-up for TIA. He has a past medical history of GERD, arthritis, season allergies.    He presented to the [facility] emergency department on 4/11/22 because of transient difficulty with comprehension. He reports that he was in his bed early in the morning, trying to read the news on his phone, happened for a duration of about 10 minutes. Felt exhausted.  \"Couldn't formulate the words in my mind\". He then got out of bed and made coffee, and then was able to read on tablet.  Felt some pressure in his head and eye twiching. L face felt sore. BP in the ED was 148/82 initially. He also reported a new pattern of headache 3 nights per week, generally resolved by waking in the morning.     TIA Evaluation Summarized  MRI and/or Head CT: MRI brain no acute finding  Intracranial Vascular Imaging: MRA head no stenosis  Cervical Carotid and Vertebral Artery Vascular Imaging: MRA neck no stenosis  Echocardiogram: LVEF 55-60%, no regional wall motion abnormalities, left atrium mildly dilated   EKG/Telemetry: sinus bradycardia  LDL: 95  A1c: 5.7  30 day monitor: SR with occasional PAC, PVC; no report of atrial fibrillation           ABCD2 Patients Score   Age ? 60 years 1 point 1   Blood Pressure     -SBP ? 140 or DBP ?  90     1 point 1   Clinical Features    -Unilateral weakness   -Speech disturbance w/o weakness    " "-Other    2 points  1 point     0 points 1   Duration of symptoms    ?60 minutes    10-59 minutes    <10 minutes    2 points  1 point  0 points 1   Diabetes  1 point 0   Patient s ABCD2 Score (0-7) = 4     He was started on aspirin and Plavix for recurrent stroke prevention.    He was seen by ALFREDO Rojas for TIA clinic 4/12/2022. At this time, he endorsed an ongoing dull left sided headache with left eye twitching. Given concern for possible TIA, it was recommended that he complete full workup with 30 day cardiac event monitor and echocardiogram. ASA/Plavix were continued x21 days, with plans to then stop Plavix and continue ASA 325mg daily alone. Statin was deferred due to lack of atherosclerotic disease and LDL <100.     Today Saulo reports things are \"really good.\"  He denies any recurrent epsiodes of speech difficulties, or any other focal neurological symptoms such as numbness, weakness or visual disturbance. He is taking  mg daily with no clear concern. He wonders if this would start to cause side effect if he could take a baby aspirin. He has not been checking his BP regularly, but does report \"it is always normal when I do get it checked.\" He is no longer having difficulty with headaches. He denies other question or concern at this time.     Modified Antonio Scale  Score: 0-No symptoms    Impression:   Problem List Items Addressed This Visit    None     Visit Diagnoses     Aphasia    -  Yaz Vernon is a 77 year old man who presents to followup on an episode of difficulty reading, concerning for TIA. TIA workup has been unrevealing and he has had no recurrent events.     Plan:   - continue ASA 325mg indefinitely for TIA/stroke prevention (would be reasonable to reduce to ASA 81mg should he develop gastric symptoms or other side effects)  - discussed the importance of long term BP management in regards to optimizing brain, heart and vessel health. I would suggest he monitor his BP, at least a couple " "of times per month to assess for any upward trends. BP goal is <140/90 with tighter control associated with improved cardiovascular outcomes   - discussed role of physical activity and diet in stroke prevention  -Signs of TIA/stroke reviewed, with patient instructed to call 911 for any subsequent stroke/TIA symptoms  - Return for Follow up, as needed.    Stroke Education provided.  He will call us with any questions.  For any acute neurologic deficits he was advised to  go directly to the hospital rather than call the clinic.    Juanita COLE, CNP  Vascular Neurology  To page me or covering stroke neurology team member, click here: [facility]   Choose \"On Call\" tab at top, then search dropdown box for \"Neurology Adult\", select location, press Enter, then look for stroke/neuro ICU/telestroke.      ___________________________________________________________________    Current Medications  Current Outpatient Medications   Medication Sig     cholecalciferol 50 MCG ( UT) tablet      esomeprazole (NEXIUM) 40 MG DR capsule Take 40 mg by mouth     Glucos-Chond-Sterol-Fish Oil (GLUCOSAMINE CHONDROITIN PLUS) CAPS      No current facility-administered medications for this visit.       Past Medical History  No past medical history on file.    Social History  Social History     Tobacco Use     Smoking status: Former Smoker     Types: Cigarettes     Quit date: 1980     Years since quittin.1     Smokeless tobacco: Never Used       Family History  No family history on file.    Physical Exam    Vitals - Patient Reported 2022   Weight (Patient Reported) 194 lb             Estimated body mass index is 27.26 kg/m  as calculated from the following:    Height as of 22: 1.778 m (5' 10\").    Weight as of 22: 86.2 kg (190 lb).      General:  no acute distress  HEENT:  normocephalic/atraumatic  Pulmonary:  no respiratory distress    Neurologic  Mental Status:  alert, oriented x 3, follows commands, speech clear " and fluent, naming and repetition normal  Cranial Nerves:  EOMI with normal smooth pursuit, facial movements symmetric, hearing mildly reduced to conversation, no dysarthria, shoulder shrug equal bilaterally,  Motor:  no abnormal movements, able to move all limbs antigravity spontaneously with no signs of hemiparesis observed  Reflexes:  unable to test (telestroke)  Sensory:  unable to test (telestroke)  Station/Gait:  unable to test (telestroke)    Neuroimaging: as per HPI. I personally reviewed those images    Labs:    Coagulation studies:  No lab results found.     Lipid panel:  Recent Labs   Lab Test 04/11/22  1200 09/12/14  1011   CHOL 172 187   HDL 61 54   LDL 95 112   TRIG 82 104       HbA1C:  Recent Labs   Lab Test 04/11/22  1200   A1C 5.7*       Troponin:  Recent Labs   Lab Test 04/11/22  1200   TROPONINIS 8     No lab results found.  No lab results found.    Billing:  Video start time: 1000  Video end time: 1014  I spent a total of 30 minutes on the day of the visit.   Time spent doing chart review, history and exam, documentation and further activities per the note        Elvis Abad  is being evaluated via a billable video visit.      How would you like to obtain your AVS? MyChart  For the video visit, send the invitation by: Text to cell phone: [phone]  Will anyone else be joining your video visit? No            Again, thank you for allowing me to participate in the care of your patient.        Sincerely,        DOUGLAS LEIVA CNP

## 2022-06-03 NOTE — PROGRESS NOTES
Elvis Abad  is being evaluated via a billable video visit.      How would you like to obtain your AVS? [employer]  For the video visit, send the invitation by: Text to cell phone: [phone]  Will anyone else be joining your video visit? No

## 2022-08-30 ENCOUNTER — RX ONLY (RX ONLY)
Age: 77
End: 2022-08-30

## 2022-09-25 ENCOUNTER — HEALTH MAINTENANCE LETTER (OUTPATIENT)
Age: 77
End: 2022-09-25

## 2022-09-26 ENCOUNTER — TELEPHONE (OUTPATIENT)
Dept: SURGERY | Facility: CLINIC | Age: 77
End: 2022-09-26

## 2022-09-30 ENCOUNTER — LAB (OUTPATIENT)
Dept: URGENT CARE | Facility: URGENT CARE | Age: 77
End: 2022-09-30
Attending: FAMILY MEDICINE
Payer: COMMERCIAL

## 2022-09-30 DIAGNOSIS — Z01.818 PRE-OPERATIVE GENERAL PHYSICAL EXAMINATION: ICD-10-CM

## 2022-09-30 PROCEDURE — U0003 INFECTIOUS AGENT DETECTION BY NUCLEIC ACID (DNA OR RNA); SEVERE ACUTE RESPIRATORY SYNDROME CORONAVIRUS 2 (SARS-COV-2) (CORONAVIRUS DISEASE [COVID-19]), AMPLIFIED PROBE TECHNIQUE, MAKING USE OF HIGH THROUGHPUT TECHNOLOGIES AS DESCRIBED BY CMS-2020-01-R: HCPCS

## 2022-09-30 PROCEDURE — U0005 INFEC AGEN DETEC AMPLI PROBE: HCPCS

## 2022-10-01 LAB — SARS-COV-2 RNA RESP QL NAA+PROBE: NEGATIVE

## 2022-10-03 ENCOUNTER — ANESTHESIA EVENT (OUTPATIENT)
Dept: SURGERY | Facility: CLINIC | Age: 77
End: 2022-10-03
Payer: COMMERCIAL

## 2022-10-03 RX ORDER — FERROUS SULFATE 325(65) MG
325 TABLET ORAL 2 TIMES DAILY
COMMUNITY

## 2022-10-03 RX ORDER — SODIUM PHOSPHATE,MONO-DIBASIC 19G-7G/118
1 ENEMA (ML) RECTAL DAILY
Status: ON HOLD | COMMUNITY
End: 2022-10-04

## 2022-10-03 RX ORDER — CHLORAL HYDRATE 500 MG
1 CAPSULE ORAL DAILY
COMMUNITY

## 2022-10-03 RX ORDER — DIPHENHYDRAMINE HCL 25 MG
25 TABLET ORAL AT BEDTIME
COMMUNITY

## 2022-10-03 RX ORDER — CHOLECALCIFEROL (VITAMIN D3) 50 MCG
1 TABLET ORAL DAILY
COMMUNITY

## 2022-10-03 RX ORDER — ONDANSETRON 4 MG/1
4 TABLET, ORALLY DISINTEGRATING ORAL EVERY 8 HOURS PRN
COMMUNITY
End: 2022-10-03

## 2022-10-03 RX ORDER — CETIRIZINE HYDROCHLORIDE 10 MG/1
10 TABLET ORAL DAILY
COMMUNITY

## 2022-10-03 RX ORDER — MULTIPLE VITAMINS W/ MINERALS TAB 9MG-400MCG
1 TAB ORAL DAILY
COMMUNITY

## 2022-10-03 RX ORDER — FLUTICASONE PROPIONATE 50 MCG
2 SPRAY, SUSPENSION (ML) NASAL DAILY PRN
COMMUNITY

## 2022-10-03 RX ORDER — HYDROCORTISONE 25 MG/G
OINTMENT TOPICAL 2 TIMES DAILY PRN
COMMUNITY
End: 2022-10-03

## 2022-10-03 RX ORDER — POLYETHYLENE GLYCOL 3350 17 G/17G
1 POWDER, FOR SOLUTION ORAL DAILY
COMMUNITY

## 2022-10-03 ASSESSMENT — LIFESTYLE VARIABLES: TOBACCO_USE: 1

## 2022-10-03 NOTE — ANESTHESIA PREPROCEDURE EVALUATION
Anesthesia Pre-Procedure Evaluation    Patient: Elvis Abad   MRN: [id number] : 1945        Procedure : Procedure(s):  RIGHT OPEN TOTAL SHOULDER ARTHROPLASTY          Past Medical History:   Diagnosis Date     Gastroesophageal reflux disease      TIA (transient ischemic attack)      Vitamin D deficiency       Past Surgical History:   Procedure Laterality Date     APPENDECTOMY       BACK SURGERY       COLONOSCOPY       EYE SURGERY       GENITOURINARY SURGERY      vasectomy      No Known Allergies   Social History     Tobacco Use     Smoking status: Former Smoker     Types: Cigarettes     Quit date: 1980     Years since quittin.5     Smokeless tobacco: Never Used   Substance Use Topics     Alcohol use: Not on file      Wt Readings from Last 1 Encounters:   22 86.2 kg (190 lb)        Anesthesia Evaluation   Pt has had prior anesthetic.     No history of anesthetic complications       ROS/MED HX  ENT/Pulmonary:     (+) tobacco use, Past use,  (-) sleep apnea   Neurologic:     (+) TIA,     Cardiovascular:     (+) -----Taking blood thinners     METS/Exercise Tolerance:     Hematologic:     (+) anemia,     Musculoskeletal:   (+) arthritis,     GI/Hepatic:     (+) GERD,     Renal/Genitourinary:  - neg Renal ROS     Endo:  - neg endo ROS  (-) Type II DM   Psychiatric/Substance Use:       Infectious Disease:       Malignancy:       Other:            Physical Exam    Airway        Mallampati: II   TM distance: > 3 FB   Neck ROM: full   Mouth opening: > 3 cm    Respiratory Devices and Support         Dental  no notable dental history         Cardiovascular   cardiovascular exam normal          Pulmonary   pulmonary exam normal                OUTSIDE LABS:  CBC:   Lab Results   Component Value Date    WBC 6.8 2022    HGB 13.6 2022    HCT 42.2 2022     2022     BMP:   Lab Results   Component Value Date     (L) 2022    POTASSIUM 4.8 2022    CHLORIDE 97  04/11/2022    CO2 28 04/11/2022    BUN 14 04/11/2022    CR 0.96 04/11/2022    GLC 90 04/11/2022     COAGS: No results found for: PTT, INR, FIBR  POC: No results found for: BGM, HCG, HCGS  HEPATIC:   Lab Results   Component Value Date    ALBUMIN 3.6 04/11/2022    PROTTOTAL 7.0 04/11/2022    ALT 25 04/11/2022    AST 18 04/11/2022    ALKPHOS 49 04/11/2022    BILITOTAL 0.5 04/11/2022     OTHER:   Lab Results   Component Value Date    A1C 5.7 (H) 04/11/2022    ROSARIO 8.5 04/11/2022       Anesthesia Plan    ASA Status:  3   NPO Status:  NPO Appropriate    Anesthesia Type: Peripheral Nerve Block.              Consents    Anesthesia Plan(s) and associated risks, benefits, and realistic alternatives discussed. Questions answered and patient/representative(s) expressed understanding.    - Discussed:     - Discussed with:  Patient         Postoperative Care    Pain management: Multi-modal analgesia.   PONV prophylaxis: Ondansetron (or other 5HT-3), Background Propofol Infusion     Comments:    Other Comments: Regional Block (interscalene) as primary anesthetic            Cristal Jung MD

## 2022-10-03 NOTE — PROGRESS NOTES
PTA medications updated by Medication Scribe prior to surgery via phone call with patient (last doses completed by Nurse)     Medication history sources: Patient, Surescripts and H&P  In the past week, patient estimated taking medication this percent of the time: Greater than 90%  Adherence assessment: N/A Not Observed    Significant changes made to the medication list:  None      Additional medication history information:   Patient was advised to bring: SYSTANE OP SOLN    Medication reconciliation completed by provider prior to medication history? No    Time spent in this activity: 30 MINUTES    The information provided in this note is only as accurate as the sources available at the time of update(s)      Prior to Admission medications    Medication Sig Last Dose Taking? Auth Provider Long Term End Date   aspirin (ASA) 325 MG EC tablet Take 325 mg by mouth daily  at AM Yes Reported, Patient     cetirizine (ZYRTEC) 10 MG tablet Take 10 mg by mouth daily  at AM Yes Reported, Patient     diphenhydrAMINE (BENADRYL) 25 MG tablet Take 25 mg by mouth At Bedtime  at PM Yes Reported, Patient     esomeprazole (NEXIUM) 40 MG DR capsule Take 40 mg by mouth 2 times daily  at PM Yes Reported, Patient     ferrous sulfate (FEROSUL) 325 (65 Fe) MG tablet Take 325 mg by mouth 2 times daily  at PM Yes Reported, Patient     fish oil-omega-3 fatty acids 1000 MG capsule Take 1 g by mouth daily  at AM Yes Reported, Patient     fluticasone (FLONASE) 50 MCG/ACT nasal spray Spray 2 sprays into both nostrils daily as needed  at PRN Yes Reported, Patient     glucosamine-chondroitin 500-400 MG CAPS per capsule Take 1 capsule by mouth daily  at AM Yes Reported, Patient     multivitamin w/minerals (THERA-VIT-M) tablet Take 1 tablet by mouth daily  at AM Yes Reported, Patient     polyethylene glycol (MIRALAX) 17 GM/Dose powder Take 1 capful by mouth daily  at AM Yes Reported, Patient     polyethylene glycol-propylene glycol (SYSTANE ULTRA)  0.4-0.3 % SOLN ophthalmic solution Place 1 drop into both eyes 2 times daily  at AM Yes Reported, Patient     vitamin D3 (CHOLECALCIFEROL) 50 mcg (2000 units) tablet Take 1 tablet by mouth daily  at AM Yes Reported, Patient

## 2022-10-04 ENCOUNTER — HOSPITAL ENCOUNTER (OUTPATIENT)
Facility: CLINIC | Age: 77
Discharge: HOME | End: 2022-10-05
Attending: ORTHOPAEDIC SURGERY | Admitting: ORTHOPAEDIC SURGERY
Payer: COMMERCIAL

## 2022-10-04 ENCOUNTER — APPOINTMENT (OUTPATIENT)
Dept: GENERAL RADIOLOGY | Facility: CLINIC | Age: 77
End: 2022-10-04
Attending: PHYSICIAN ASSISTANT
Payer: COMMERCIAL

## 2022-10-04 ENCOUNTER — ANESTHESIA (OUTPATIENT)
Dept: SURGERY | Facility: CLINIC | Age: 77
End: 2022-10-04
Payer: COMMERCIAL

## 2022-10-04 DIAGNOSIS — Z98.890 POST-OPERATIVE STATE: Primary | ICD-10-CM

## 2022-10-04 PROCEDURE — 250N000011 HC RX IP 250 OP 636: Performed by: ANESTHESIOLOGY

## 2022-10-04 PROCEDURE — 250N000009 HC RX 250: Performed by: ANESTHESIOLOGY

## 2022-10-04 PROCEDURE — 250N000009 HC RX 250: Performed by: NURSE ANESTHETIST, CERTIFIED REGISTERED

## 2022-10-04 PROCEDURE — 250N000011 HC RX IP 250 OP 636: Performed by: NURSE ANESTHETIST, CERTIFIED REGISTERED

## 2022-10-04 PROCEDURE — 99207 PR NO CHARGE LOS: CPT | Performed by: NURSE PRACTITIONER

## 2022-10-04 PROCEDURE — 258N000003 HC RX IP 258 OP 636: Performed by: PHYSICIAN ASSISTANT

## 2022-10-04 PROCEDURE — 272N000001 HC OR GENERAL SUPPLY STERILE: Performed by: ORTHOPAEDIC SURGERY

## 2022-10-04 PROCEDURE — 258N000003 HC RX IP 258 OP 636: Performed by: ANESTHESIOLOGY

## 2022-10-04 PROCEDURE — C9290 INJ, BUPIVACAINE LIPOSOME: HCPCS | Performed by: ANESTHESIOLOGY

## 2022-10-04 PROCEDURE — 999N000063 XR SHOULDER RIGHT PORT G/E 2 VIEWS: Mod: RT

## 2022-10-04 PROCEDURE — 250N000011 HC RX IP 250 OP 636: Performed by: PHYSICIAN ASSISTANT

## 2022-10-04 PROCEDURE — 999N000010 HC STATISTIC ANES STAT CODE-CRNA PER MINUTE: Performed by: ORTHOPAEDIC SURGERY

## 2022-10-04 PROCEDURE — 250N000013 HC RX MED GY IP 250 OP 250 PS 637: Performed by: PHYSICIAN ASSISTANT

## 2022-10-04 PROCEDURE — 250N000009 HC RX 250: Performed by: ORTHOPAEDIC SURGERY

## 2022-10-04 PROCEDURE — 710N000009 HC RECOVERY PHASE 1, LEVEL 1, PER MIN: Performed by: ORTHOPAEDIC SURGERY

## 2022-10-04 PROCEDURE — C1776 JOINT DEVICE (IMPLANTABLE): HCPCS | Performed by: ORTHOPAEDIC SURGERY

## 2022-10-04 PROCEDURE — 360N000077 HC SURGERY LEVEL 4, PER MIN: Performed by: ORTHOPAEDIC SURGERY

## 2022-10-04 PROCEDURE — 258N000003 HC RX IP 258 OP 636: Performed by: NURSE ANESTHETIST, CERTIFIED REGISTERED

## 2022-10-04 PROCEDURE — 258N000001 HC RX 258: Performed by: ORTHOPAEDIC SURGERY

## 2022-10-04 PROCEDURE — 370N000017 HC ANESTHESIA TECHNICAL FEE, PER MIN: Performed by: ORTHOPAEDIC SURGERY

## 2022-10-04 PROCEDURE — 999N000141 HC STATISTIC PRE-PROCEDURE NURSING ASSESSMENT: Performed by: ORTHOPAEDIC SURGERY

## 2022-10-04 DEVICE — IMPLANTABLE DEVICE: Type: IMPLANTABLE DEVICE | Site: SHOULDER | Status: FUNCTIONAL

## 2022-10-04 RX ORDER — CEFAZOLIN SODIUM 2 G/100ML
2 INJECTION, SOLUTION INTRAVENOUS EVERY 8 HOURS
Status: COMPLETED | OUTPATIENT
Start: 2022-10-04 | End: 2022-10-05

## 2022-10-04 RX ORDER — ONDANSETRON 4 MG/1
4 TABLET, ORALLY DISINTEGRATING ORAL EVERY 30 MIN PRN
Status: DISCONTINUED | OUTPATIENT
Start: 2022-10-04 | End: 2022-10-04 | Stop reason: HOSPADM

## 2022-10-04 RX ORDER — NALOXONE HYDROCHLORIDE 0.4 MG/ML
0.2 INJECTION, SOLUTION INTRAMUSCULAR; INTRAVENOUS; SUBCUTANEOUS
Status: DISCONTINUED | OUTPATIENT
Start: 2022-10-04 | End: 2022-10-05 | Stop reason: HOSPADM

## 2022-10-04 RX ORDER — VANCOMYCIN HYDROCHLORIDE 1 G/20ML
INJECTION, POWDER, LYOPHILIZED, FOR SOLUTION INTRAVENOUS
Status: DISCONTINUED
Start: 2022-10-04 | End: 2022-10-04 | Stop reason: HOSPADM

## 2022-10-04 RX ORDER — LABETALOL HYDROCHLORIDE 5 MG/ML
10 INJECTION, SOLUTION INTRAVENOUS
Status: DISCONTINUED | OUTPATIENT
Start: 2022-10-04 | End: 2022-10-04 | Stop reason: HOSPADM

## 2022-10-04 RX ORDER — ACETAMINOPHEN 325 MG/1
650 TABLET ORAL EVERY 4 HOURS PRN
Qty: 100 TABLET | Refills: 0 | Status: SHIPPED | OUTPATIENT
Start: 2022-10-04

## 2022-10-04 RX ORDER — ONDANSETRON 2 MG/ML
INJECTION INTRAMUSCULAR; INTRAVENOUS PRN
Status: DISCONTINUED | OUTPATIENT
Start: 2022-10-04 | End: 2022-10-04

## 2022-10-04 RX ORDER — CEFAZOLIN SODIUM/WATER 2 G/20 ML
2 SYRINGE (ML) INTRAVENOUS
Status: COMPLETED | OUTPATIENT
Start: 2022-10-04 | End: 2022-10-04

## 2022-10-04 RX ORDER — BISACODYL 10 MG
10 SUPPOSITORY, RECTAL RECTAL DAILY PRN
Status: DISCONTINUED | OUTPATIENT
Start: 2022-10-04 | End: 2022-10-05 | Stop reason: HOSPADM

## 2022-10-04 RX ORDER — OXYCODONE HYDROCHLORIDE 5 MG/1
5 TABLET ORAL EVERY 4 HOURS PRN
Status: DISCONTINUED | OUTPATIENT
Start: 2022-10-04 | End: 2022-10-04 | Stop reason: HOSPADM

## 2022-10-04 RX ORDER — CEFAZOLIN SODIUM/WATER 2 G/20 ML
2 SYRINGE (ML) INTRAVENOUS SEE ADMIN INSTRUCTIONS
Status: DISCONTINUED | OUTPATIENT
Start: 2022-10-04 | End: 2022-10-04 | Stop reason: HOSPADM

## 2022-10-04 RX ORDER — LIDOCAINE 40 MG/G
CREAM TOPICAL
Status: DISCONTINUED | OUTPATIENT
Start: 2022-10-04 | End: 2022-10-05 | Stop reason: HOSPADM

## 2022-10-04 RX ORDER — PROCHLORPERAZINE MALEATE 5 MG
5 TABLET ORAL EVERY 6 HOURS PRN
Status: DISCONTINUED | OUTPATIENT
Start: 2022-10-04 | End: 2022-10-05 | Stop reason: HOSPADM

## 2022-10-04 RX ORDER — PROPOFOL 10 MG/ML
INJECTION, EMULSION INTRAVENOUS CONTINUOUS PRN
Status: DISCONTINUED | OUTPATIENT
Start: 2022-10-04 | End: 2022-10-04

## 2022-10-04 RX ORDER — AMOXICILLIN 250 MG
1 CAPSULE ORAL 2 TIMES DAILY
Status: DISCONTINUED | OUTPATIENT
Start: 2022-10-04 | End: 2022-10-05 | Stop reason: HOSPADM

## 2022-10-04 RX ORDER — FENTANYL CITRATE 0.05 MG/ML
25 INJECTION, SOLUTION INTRAMUSCULAR; INTRAVENOUS EVERY 5 MIN PRN
Status: DISCONTINUED | OUTPATIENT
Start: 2022-10-04 | End: 2022-10-04 | Stop reason: HOSPADM

## 2022-10-04 RX ORDER — SODIUM CHLORIDE, SODIUM LACTATE, POTASSIUM CHLORIDE, CALCIUM CHLORIDE 600; 310; 30; 20 MG/100ML; MG/100ML; MG/100ML; MG/100ML
INJECTION, SOLUTION INTRAVENOUS CONTINUOUS
Status: DISCONTINUED | OUTPATIENT
Start: 2022-10-04 | End: 2022-10-04 | Stop reason: HOSPADM

## 2022-10-04 RX ORDER — HYDROMORPHONE HCL IN WATER/PF 6 MG/30 ML
0.2 PATIENT CONTROLLED ANALGESIA SYRINGE INTRAVENOUS
Status: DISCONTINUED | OUTPATIENT
Start: 2022-10-04 | End: 2022-10-05 | Stop reason: HOSPADM

## 2022-10-04 RX ORDER — OXYCODONE HYDROCHLORIDE 5 MG/1
10 TABLET ORAL EVERY 4 HOURS PRN
Status: DISCONTINUED | OUTPATIENT
Start: 2022-10-04 | End: 2022-10-05 | Stop reason: HOSPADM

## 2022-10-04 RX ORDER — ONDANSETRON 2 MG/ML
4 INJECTION INTRAMUSCULAR; INTRAVENOUS EVERY 30 MIN PRN
Status: DISCONTINUED | OUTPATIENT
Start: 2022-10-04 | End: 2022-10-04 | Stop reason: HOSPADM

## 2022-10-04 RX ORDER — DEXMEDETOMIDINE HYDROCHLORIDE 4 UG/ML
INJECTION, SOLUTION INTRAVENOUS PRN
Status: DISCONTINUED | OUTPATIENT
Start: 2022-10-04 | End: 2022-10-04

## 2022-10-04 RX ORDER — HYDROXYZINE HYDROCHLORIDE 10 MG/1
10 TABLET, FILM COATED ORAL EVERY 6 HOURS PRN
Status: DISCONTINUED | OUTPATIENT
Start: 2022-10-04 | End: 2022-10-05 | Stop reason: HOSPADM

## 2022-10-04 RX ORDER — DIPHENHYDRAMINE HCL 12.5MG/5ML
12.5 LIQUID (ML) ORAL EVERY 6 HOURS PRN
Status: DISCONTINUED | OUTPATIENT
Start: 2022-10-04 | End: 2022-10-05 | Stop reason: HOSPADM

## 2022-10-04 RX ORDER — AMOXICILLIN 250 MG
1-2 CAPSULE ORAL 2 TIMES DAILY
Qty: 100 TABLET | Refills: 0 | Status: SHIPPED | OUTPATIENT
Start: 2022-10-04

## 2022-10-04 RX ORDER — CELECOXIB 100 MG/1
100 CAPSULE ORAL 2 TIMES DAILY
Status: DISCONTINUED | OUTPATIENT
Start: 2022-10-04 | End: 2022-10-05 | Stop reason: HOSPADM

## 2022-10-04 RX ORDER — ACETAMINOPHEN 325 MG/1
975 TABLET ORAL EVERY 8 HOURS
Status: DISCONTINUED | OUTPATIENT
Start: 2022-10-04 | End: 2022-10-05 | Stop reason: HOSPADM

## 2022-10-04 RX ORDER — ONDANSETRON 4 MG/1
4 TABLET, ORALLY DISINTEGRATING ORAL EVERY 6 HOURS PRN
Status: DISCONTINUED | OUTPATIENT
Start: 2022-10-04 | End: 2022-10-05 | Stop reason: HOSPADM

## 2022-10-04 RX ORDER — HYDROMORPHONE HCL IN WATER/PF 6 MG/30 ML
0.4 PATIENT CONTROLLED ANALGESIA SYRINGE INTRAVENOUS
Status: DISCONTINUED | OUTPATIENT
Start: 2022-10-04 | End: 2022-10-05 | Stop reason: HOSPADM

## 2022-10-04 RX ORDER — ACETAMINOPHEN 325 MG/1
975 TABLET ORAL ONCE
Status: COMPLETED | OUTPATIENT
Start: 2022-10-04 | End: 2022-10-04

## 2022-10-04 RX ORDER — FENTANYL CITRATE 50 UG/ML
INJECTION, SOLUTION INTRAMUSCULAR; INTRAVENOUS PRN
Status: DISCONTINUED | OUTPATIENT
Start: 2022-10-04 | End: 2022-10-04

## 2022-10-04 RX ORDER — ONDANSETRON 2 MG/ML
4 INJECTION INTRAMUSCULAR; INTRAVENOUS EVERY 6 HOURS PRN
Status: DISCONTINUED | OUTPATIENT
Start: 2022-10-04 | End: 2022-10-05 | Stop reason: HOSPADM

## 2022-10-04 RX ORDER — POLYETHYLENE GLYCOL 3350 17 G/17G
17 POWDER, FOR SOLUTION ORAL DAILY
Status: DISCONTINUED | OUTPATIENT
Start: 2022-10-05 | End: 2022-10-05 | Stop reason: HOSPADM

## 2022-10-04 RX ORDER — CELECOXIB 200 MG/1
400 CAPSULE ORAL ONCE
Status: COMPLETED | OUTPATIENT
Start: 2022-10-04 | End: 2022-10-04

## 2022-10-04 RX ORDER — LIDOCAINE HYDROCHLORIDE 20 MG/ML
INJECTION, SOLUTION INFILTRATION; PERINEURAL PRN
Status: DISCONTINUED | OUTPATIENT
Start: 2022-10-04 | End: 2022-10-04

## 2022-10-04 RX ORDER — OXYCODONE HYDROCHLORIDE 5 MG/1
5 TABLET ORAL EVERY 4 HOURS PRN
Qty: 30 TABLET | Refills: 0 | Status: SHIPPED | OUTPATIENT
Start: 2022-10-04

## 2022-10-04 RX ORDER — NALOXONE HYDROCHLORIDE 0.4 MG/ML
0.4 INJECTION, SOLUTION INTRAMUSCULAR; INTRAVENOUS; SUBCUTANEOUS
Status: DISCONTINUED | OUTPATIENT
Start: 2022-10-04 | End: 2022-10-05 | Stop reason: HOSPADM

## 2022-10-04 RX ORDER — TRANEXAMIC ACID 650 MG/1
1950 TABLET ORAL ONCE
Status: COMPLETED | OUTPATIENT
Start: 2022-10-04 | End: 2022-10-04

## 2022-10-04 RX ORDER — SODIUM CHLORIDE, SODIUM LACTATE, POTASSIUM CHLORIDE, CALCIUM CHLORIDE 600; 310; 30; 20 MG/100ML; MG/100ML; MG/100ML; MG/100ML
INJECTION, SOLUTION INTRAVENOUS CONTINUOUS
Status: DISCONTINUED | OUTPATIENT
Start: 2022-10-04 | End: 2022-10-05 | Stop reason: HOSPADM

## 2022-10-04 RX ORDER — MAGNESIUM HYDROXIDE 1200 MG/15ML
LIQUID ORAL PRN
Status: DISCONTINUED | OUTPATIENT
Start: 2022-10-04 | End: 2022-10-04 | Stop reason: HOSPADM

## 2022-10-04 RX ORDER — OXYCODONE HYDROCHLORIDE 5 MG/1
5 TABLET ORAL EVERY 4 HOURS PRN
Status: DISCONTINUED | OUTPATIENT
Start: 2022-10-04 | End: 2022-10-05 | Stop reason: HOSPADM

## 2022-10-04 RX ORDER — CELECOXIB 200 MG/1
200 CAPSULE ORAL DAILY
Qty: 30 CAPSULE | Refills: 0 | Status: SHIPPED | OUTPATIENT
Start: 2022-10-04

## 2022-10-04 RX ORDER — HYDROMORPHONE HCL IN WATER/PF 6 MG/30 ML
0.2 PATIENT CONTROLLED ANALGESIA SYRINGE INTRAVENOUS EVERY 5 MIN PRN
Status: DISCONTINUED | OUTPATIENT
Start: 2022-10-04 | End: 2022-10-04 | Stop reason: HOSPADM

## 2022-10-04 RX ORDER — HYDRALAZINE HYDROCHLORIDE 20 MG/ML
2.5-5 INJECTION INTRAMUSCULAR; INTRAVENOUS EVERY 10 MIN PRN
Status: DISCONTINUED | OUTPATIENT
Start: 2022-10-04 | End: 2022-10-04 | Stop reason: HOSPADM

## 2022-10-04 RX ORDER — DEXAMETHASONE SODIUM PHOSPHATE 4 MG/ML
INJECTION, SOLUTION INTRA-ARTICULAR; INTRALESIONAL; INTRAMUSCULAR; INTRAVENOUS; SOFT TISSUE PRN
Status: DISCONTINUED | OUTPATIENT
Start: 2022-10-04 | End: 2022-10-04

## 2022-10-04 RX ADMIN — ASPIRIN 325 MG: 325 TABLET, COATED ORAL at 13:16

## 2022-10-04 RX ADMIN — CELECOXIB 400 MG: 200 CAPSULE ORAL at 07:32

## 2022-10-04 RX ADMIN — FENTANYL CITRATE 25 MCG: 50 INJECTION, SOLUTION INTRAMUSCULAR; INTRAVENOUS at 10:33

## 2022-10-04 RX ADMIN — ACETAMINOPHEN 975 MG: 325 TABLET, FILM COATED ORAL at 22:38

## 2022-10-04 RX ADMIN — CELECOXIB 100 MG: 100 CAPSULE ORAL at 19:57

## 2022-10-04 RX ADMIN — SODIUM CHLORIDE, POTASSIUM CHLORIDE, SODIUM LACTATE AND CALCIUM CHLORIDE: 600; 310; 30; 20 INJECTION, SOLUTION INTRAVENOUS at 10:33

## 2022-10-04 RX ADMIN — CEFAZOLIN SODIUM 2 G: 2 INJECTION, SOLUTION INTRAVENOUS at 17:30

## 2022-10-04 RX ADMIN — BUPIVACAINE 10 ML: 13.3 INJECTION, SUSPENSION, LIPOSOMAL INFILTRATION at 08:00

## 2022-10-04 RX ADMIN — PHENYLEPHRINE HYDROCHLORIDE 0.3 MCG/KG/MIN: 10 INJECTION INTRAVENOUS at 09:00

## 2022-10-04 RX ADMIN — ONDANSETRON 4 MG: 2 INJECTION INTRAMUSCULAR; INTRAVENOUS at 08:55

## 2022-10-04 RX ADMIN — ACETAMINOPHEN 975 MG: 325 TABLET, FILM COATED ORAL at 15:35

## 2022-10-04 RX ADMIN — BUPIVACAINE HYDROCHLORIDE 10 ML: 5 INJECTION, SOLUTION EPIDURAL; INTRACAUDAL at 08:00

## 2022-10-04 RX ADMIN — ACETAMINOPHEN 975 MG: 325 TABLET, FILM COATED ORAL at 07:32

## 2022-10-04 RX ADMIN — HYDROMORPHONE HYDROCHLORIDE 0.2 MG: 0.2 INJECTION, SOLUTION INTRAMUSCULAR; INTRAVENOUS; SUBCUTANEOUS at 10:46

## 2022-10-04 RX ADMIN — Medication 2 G: at 08:51

## 2022-10-04 RX ADMIN — SENNOSIDES AND DOCUSATE SODIUM 1 TABLET: 50; 8.6 TABLET ORAL at 13:16

## 2022-10-04 RX ADMIN — DEXMEDETOMIDINE HYDROCHLORIDE 8 MCG: 200 INJECTION INTRAVENOUS at 09:15

## 2022-10-04 RX ADMIN — DIPHENHYDRAMINE HYDROCHLORIDE 12.5 MG: 25 SOLUTION ORAL at 22:38

## 2022-10-04 RX ADMIN — TRANEXAMIC ACID 1950 MG: 650 TABLET ORAL at 07:33

## 2022-10-04 RX ADMIN — PROPOFOL 150 MCG/KG/MIN: 10 INJECTION, EMULSION INTRAVENOUS at 08:51

## 2022-10-04 RX ADMIN — DEXAMETHASONE SODIUM PHOSPHATE 4 MG: 4 INJECTION, SOLUTION INTRA-ARTICULAR; INTRALESIONAL; INTRAMUSCULAR; INTRAVENOUS; SOFT TISSUE at 09:00

## 2022-10-04 RX ADMIN — FENTANYL CITRATE 25 MCG: 50 INJECTION, SOLUTION INTRAMUSCULAR; INTRAVENOUS at 10:43

## 2022-10-04 RX ADMIN — SENNOSIDES AND DOCUSATE SODIUM 1 TABLET: 50; 8.6 TABLET ORAL at 21:42

## 2022-10-04 RX ADMIN — LIDOCAINE HYDROCHLORIDE 20 MG: 20 INJECTION, SOLUTION INFILTRATION; PERINEURAL at 08:55

## 2022-10-04 RX ADMIN — FENTANYL CITRATE 25 MCG: 50 INJECTION, SOLUTION INTRAMUSCULAR; INTRAVENOUS at 09:00

## 2022-10-04 RX ADMIN — HYDROMORPHONE HYDROCHLORIDE 0.2 MG: 0.2 INJECTION, SOLUTION INTRAMUSCULAR; INTRAVENOUS; SUBCUTANEOUS at 11:15

## 2022-10-04 RX ADMIN — MIDAZOLAM HYDROCHLORIDE 2 MG: 1 INJECTION, SOLUTION INTRAMUSCULAR; INTRAVENOUS at 07:47

## 2022-10-04 RX ADMIN — SODIUM CHLORIDE, POTASSIUM CHLORIDE, SODIUM LACTATE AND CALCIUM CHLORIDE: 600; 310; 30; 20 INJECTION, SOLUTION INTRAVENOUS at 07:36

## 2022-10-04 ASSESSMENT — ACTIVITIES OF DAILY LIVING (ADL)
ADLS_ACUITY_SCORE: 24
ADLS_ACUITY_SCORE: 24
ADLS_ACUITY_SCORE: 20
ADLS_ACUITY_SCORE: 24
ADLS_ACUITY_SCORE: 35
ADLS_ACUITY_SCORE: 20
ADLS_ACUITY_SCORE: 20

## 2022-10-04 NOTE — ANESTHESIA PROCEDURE NOTES
Interscalene Procedure Note    Pre-Procedure   Staff -        Anesthesiologist:  Cristal Jung MD       Performed By: anesthesiologist       Location: pre-op       Procedure Start/Stop Times: 10/4/2022 7:50 AM and 10/4/2022 8:00 AM       Pre-Anesthestic Checklist: patient identified, IV checked, site marked, risks and benefits discussed, informed consent, monitors and equipment checked, pre-op evaluation, at physician/surgeon's request and post-op pain management  Timeout:       Correct Patient: Yes        Correct Procedure: Yes        Correct Site: Yes        Correct Position: Yes        Correct Laterality: Yes        Site Marked: Yes  Procedure Documentation  Procedure: Interscalene       Laterality: right       Patient Position: sitting       Patient Prep/Sterile Barriers: sterile gloves, mask, no-touch technique       Skin prep: Chloraprep       Local skin infiltrated with 3 mL of 1% lidocaine.        Needle Type: insulated and short bevel       Needle Gauge: 21.        Needle Length (millimeters): 50        Ultrasound guided       1. Ultrasound was used to identify targeted nerve, plexus, vascular marker, or fascial plane and place a needle adjacent to it in real-time.       2. Ultrasound was used to visualize the spread of anesthetic in close proximity to the above referenced structure.       3. A permanent image is entered into the patient's record.       4. The visualized anatomic structures appeared normal.       5. There were no apparent abnormal pathologic findings.    Assessment/Narrative         The placement was negative for: blood aspirated, painful injection and site bleeding       Paresthesias: No.       Test dose of mL at.         Test dose negative, 3 minutes after injection, for signs of intravascular, subdural, or intrathecal injection.       Bolus given via needle..        Secured via.        Insertion/Infusion Method: Single Shot       Complications: none       Injection made  incrementally with aspirations every 5 mL.    Medication(s) Administered   Bupivacaine 0.5% w/ 1:400K Epi (Injection) - Injection   10 mL - 10/4/2022 8:00:00 AM  Bupivacaine liposome (Exparel) 1.3% LA inj susp (Infiltration) - Infiltration   10 mL - 10/4/2022 8:00:00 AM  Medication Administration Time: 10/4/2022 7:50 AM     Comments:  0.5% Bupivacaine with 1:400,000 epinephrine injected. Patient tolerated the procedure well.     The surgeon has given a verbal order transferring care of this patient to me for the performance of a regional analgesia block for intra op. I

## 2022-10-04 NOTE — INTERVAL H&P NOTE
"I have reviewed the surgical (or preoperative) H&P that is linked to this encounter, and examined the patient. There are no significant changes    Clinical Conditions Present on Arrival:  Clinically Significant Risk Factors Present on Admission                   # Overweight: Estimated body mass index is 27.98 kg/m  as calculated from the following:    Height as of this encounter: 1.778 m (5' 10\").    Weight as of this encounter: 88.5 kg (195 lb).       "

## 2022-10-04 NOTE — CONSULTS
I have reviewed pt's preoperative H&P, intraop/anesthesia record and post op VS.  I have performed medication reconcilation for admit and discharge.  In the absence of any acute medical issues and anticipated discharge date of 10/5/22 hospitalist service will defer formal consultation.  Please do not hesitate to reconsult us if any acute medical issues arise during the patient's hospitalization.

## 2022-10-04 NOTE — ANESTHESIA CARE TRANSFER NOTE
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    Anesthesia Type:   Peripheral Nerve Block     Note:    Oropharynx: oropharynx clear of all foreign objects  Level of Consciousness: awake  Oxygen Supplementation: room air    Independent Airway: airway patency satisfactory and stable  Dentition: dentition unchanged  Vital Signs Stable: post-procedure vital signs reviewed and stable  Report to RN Given: handoff report given  Patient transferred to: PACU    Handoff Report: Identifed the Patient, Identified the Reponsible Provider, Reviewed the pertinent medical history, Discussed the surgical course, Reviewed Intra-OP anesthesia mangement and issues during anesthesia, Set expectations for post-procedure period and Allowed opportunity for questions and acknowledgement of understanding      Vitals:  Vitals Value Taken Time   /70 10/04/22 1024   Temp     Pulse 68 10/04/22 1029   Resp 14 10/04/22 1029   SpO2 94 % 10/04/22 1029   Vitals shown include unvalidated device data.    Electronically Signed By: DOUGLAS Christine CRNA  October 4, 2022  10:30 AM

## 2022-10-04 NOTE — PROGRESS NOTES
"SPIRITUAL HEALTH SERVICES Progress Note  [facility] Ortho Surg    Saw ptnt per epic consult. He asked for a \"prayer of thanksgiving\", stating that he was \"blessed\" by his doctors, nurses, wife, ext. He asked for prayer, which I provided.      services remain available.     ADAMA MurrayDiv  Chaplain Resident   Lyysi-[phone]   "

## 2022-10-04 NOTE — PROCEDURES
DATE OF SERVICE: 10/4/2022     SURGEON   LANDEN CAMPOS MD     FIRST ASSISTANT   DANICA DENG PA-C   Please bill for Mr. Deng as first assistant as there was no resident available to assist in this case. Assistants were necessary and performed positioning, draping, retraction, suturing and wound dressing tasks throughout the surgical procedure. The assistant was present for the entire procedure.    ASSISTANTS  KEVIN Wilson    PREOPERATIVE DIAGNOSIS   End-stage right glenohumeral osteoarthritis.     POSTOPERATIVE DIAGNOSIS   End-stage right glenohumeral osteoarthritis.     PROCEDURE PERFORMED   1. Right  total shoulder arthroplasty using Tom components, a size 52 mm tantalum-back glenoid component, a size 15 x 130 mm stem, offset size 52 x 21 mm humeral head.   2. Open right biceps tenodesis.     INDICATION FOR SURGERY   Elvis Abad [id number] is an 77 year old-year-old male, with a long history of increasing right shoulder pain. We have failed all reasonable non-operative options as far as controlling the pain and improving function. The x-rays show advanced glenohumeral arthrosis with narrowing of the joint space and large marginal osteophytes. After discussing with the patient, it was decided they would benefit from the above-named procedure. Informed consent was obtained.     ANESTHETIC   Interscalene block with MAC anesthesia.     FINDINGS   There was complete eburnation of both the glenoid and the humerus. The biceps was intact. The rotator cuff was intact.     DESCRIPTION OF PROCEDURE   Evlis Abad was personally identified by me. The right upper extremity was marked. A long-acting interscalene block was placed. The patient was then taken to the operating room, where they were placed under MAC anesthetic. They were placed in low beach-chair position. They were then prepped and draped in the usual fashion. A timeout was then performed. An incision was made from the coracoid down to the  insertion of the deltoid after injection with anaesthetic solution. The deltopectoral fascia was identified. The deltopectoral interval was released, taking the cephalic vein medially, and cauterizing all the branches that attached to the deltoid. The deltoid retractor was placed in the subdeltoid space. The coracoacromial ligament was released. The conjoined tendon was palpated. The musculocutaneous nerve and axillary nerve were palpated and were well away from the site. The biceps was unroofed and tenotomized as far proximal as possible. The lesser tuberosity was then osteotomized, and release of the inferior capsule with the subscapularis was performed. The arm was then placed in a shotgun position. There was a very large amount of osteophyte, which was removed using a rongeur and half-inch curved osteotome. We started reaming with the starting reamer, then went up to a size 15 with excellent cortical chatter. The reamer was left in place. The external cutting guide was placed. It was felt that 20-30 degrees of retroversion matched the patient's natural retroversion. This was a very nice balance.Two Irving retractors were placed posteriorly. We then cut the head right at the insertion of the rotator cuff. The stem was then countersunk, and the trial size 14 stem was placed at 30 degrees of retroversion. Attention was then turned to the glenoid. A Fukuda retractor was placed posteriorly and a Bankart retractor placed anteriorly. A complete labrectomy was performed, as well as release of the rest of the biceps tendon. The sizing guide was placed and a Radha pin was drilled through the central hole of the sizing guide and left in place. The guide was removed. The central hole was drilled over the pin. The Radha pin was then removed. The face of the glenoid was then reamed down to a nice flat bed with bleeding bone. The drill guide was placed to drill the holes for the glenoid component, and the holes were  made. The second drill guide was used to make the final holes in the glenoid. A chisel was used for the final punching for the glenoid component. The trial glenoid component was then placed. This had a very nice flat fit. We thoroughly irrigated and dried the glenoid. About half of the joint cocktail was injected around the glenoid. The permanent glenoid implant was impacted into place. Attention was turned to the humerus again. The trial component was removed. We placed four #2 Force Fiber sutures through the bicipital groove.  About a third of the vancomycin powder was placed down the humeral canal and the permanent stem was impacted into place. We trialed off this, and it was felt that the 52 x 21 mm head gave us the best fit with the maximal amount of thickness placed inferiorly. The trunion was thoroughly dried, and the permanent head was impacted into place. The shoulder was then relocated. We had approximately 50% excursion posteriorly. The subscapularis was repaired back to its bed with the #2 FiberWire sutures that had been placed around the stem. The rotator interval was closed with interrupted #2 FiberWire sutures. The open biceps tenodesis was also performed using the #2 FiberWire. The rest of the vancomycin powder was placed in the subdeltoid space. The deltopectoral interval was closed with interrupted #2 FiberWire. The remaining joint cocktail was injected into the remaining superficial tissues.The deep fatty layer was closed with running 0 Stratafix, subdermal closed with a 2-0 Stratafix, and skin closed with #2 Quill. The Prineo dressing was applied and an ice pack and shoulder sling were placed on the patient. There were no complications. Sponge and needle counts were correct. The patient taken to PAR in stable condition.     SPECIMENS  None    COMPLICATIONS  None    ESTIMATED BLOOD LOSS  75 mL    CONDITION ON DISCHARGE FROM OR  Satisfactory    PLAN  1. Antibiotic Prophylaxis was given included  Ancef 2 gm. Antibiotics given within 1 hour of surgical incision and discontinued within 24 hrs.   2. DVT prophylaxis ASA given within 24 hours    3. Weight Bearing Non-weight bearing NWB.   4. Discharge anticipated date POD# 1 to Home  5. Pain Medication oxycodone, Tylenol and Celebrex  6. Leave Prineo dressing in place if applied in OR. LANDEN CAMPOS MD      @C(1)@ [facility] - [phone]

## 2022-10-04 NOTE — PROGRESS NOTES
SPIRITUAL HEALTH SERVICES Progress Note  [facility] OR per request    I met with pt Saulo and his wife before surgery. We shared reflective conversation about their ashly lives, family, and marriage. I offered prayer for a successful surgery and recovery.    Spiritual Health remains available for spiritual and emotional support upon request.     Marti Fair  Chaplain Resident  Spiritual Health Services  Pager: [phone]

## 2022-10-04 NOTE — PLAN OF CARE
Goal Outcome Evaluation:        A/O x4. VSS on RA. Up SBA. Regular diet. C/o 3/10 pain, scheduled Tylenol given. Sling in place. CMS intact. NWB to RUE. Discharge pending.

## 2022-10-04 NOTE — ANESTHESIA POSTPROCEDURE EVALUATION
Patient: Elvis Abad    Procedure: Procedure(s):  RIGHT OPEN TOTAL SHOULDER ARTHROPLASTY       Anesthesia Type:  Peripheral Nerve Block    Note:  Disposition: Admission   Postop Pain Control: Uneventful            Sign Out: Well controlled pain   PONV: No   Neuro/Psych: Uneventful            Sign Out: Acceptable/Baseline neuro status   Airway/Respiratory: Uneventful            Sign Out: Acceptable/Baseline resp. status   CV/Hemodynamics: Uneventful            Sign Out: Acceptable CV status; No obvious hypovolemia; No obvious fluid overload   Other NRE: NONE   DID A NON-ROUTINE EVENT OCCUR? No           Last vitals:  Vitals Value Taken Time   /82 10/04/22 1140   Temp 36.1  C (97  F) 10/04/22 1110   Pulse 61 10/04/22 1145   Resp 16 10/04/22 1120   SpO2 95 % 10/04/22 1145   Vitals shown include unvalidated device data.    Electronically Signed By: Cristal Jung MD  October 4, 2022  12:19 PM

## 2022-10-04 NOTE — OR NURSING
X Ray at bedside completed- Dr Chaidez at bedside talking to pt- OK to transport to room per Dr Jung.

## 2022-10-05 ENCOUNTER — APPOINTMENT (OUTPATIENT)
Dept: OCCUPATIONAL THERAPY | Facility: CLINIC | Age: 77
End: 2022-10-05
Attending: PHYSICIAN ASSISTANT
Payer: COMMERCIAL

## 2022-10-05 VITALS
DIASTOLIC BLOOD PRESSURE: 52 MMHG | SYSTOLIC BLOOD PRESSURE: 127 MMHG | OXYGEN SATURATION: 94 % | HEART RATE: 74 BPM | BODY MASS INDEX: 27.92 KG/M2 | HEIGHT: 70 IN | TEMPERATURE: 98.2 F | RESPIRATION RATE: 14 BRPM | WEIGHT: 195 LBS

## 2022-10-05 LAB
CREAT SERPL-MCNC: 0.85 MG/DL (ref 0.66–1.25)
GFR SERPL CREATININE-BSD FRML MDRD: 89 ML/MIN/1.73M2
GLUCOSE BLDC GLUCOMTR-MCNC: 105 MG/DL (ref 70–99)
HGB BLD-MCNC: 9.3 G/DL (ref 13.3–17.7)

## 2022-10-05 PROCEDURE — 250N000013 HC RX MED GY IP 250 OP 250 PS 637: Performed by: PHYSICIAN ASSISTANT

## 2022-10-05 PROCEDURE — 36415 COLL VENOUS BLD VENIPUNCTURE: CPT | Performed by: ORTHOPAEDIC SURGERY

## 2022-10-05 PROCEDURE — 82962 GLUCOSE BLOOD TEST: CPT | Mod: GZ

## 2022-10-05 PROCEDURE — 97535 SELF CARE MNGMENT TRAINING: CPT | Mod: GO | Performed by: OCCUPATIONAL THERAPIST

## 2022-10-05 PROCEDURE — 97110 THERAPEUTIC EXERCISES: CPT | Mod: GO | Performed by: OCCUPATIONAL THERAPIST

## 2022-10-05 PROCEDURE — 82565 ASSAY OF CREATININE: CPT | Performed by: ORTHOPAEDIC SURGERY

## 2022-10-05 PROCEDURE — 97165 OT EVAL LOW COMPLEX 30 MIN: CPT | Mod: GO | Performed by: OCCUPATIONAL THERAPIST

## 2022-10-05 PROCEDURE — 250N000011 HC RX IP 250 OP 636: Performed by: PHYSICIAN ASSISTANT

## 2022-10-05 PROCEDURE — 85018 HEMOGLOBIN: CPT | Performed by: PHYSICIAN ASSISTANT

## 2022-10-05 RX ORDER — CETIRIZINE HYDROCHLORIDE 10 MG/1
10 TABLET ORAL DAILY
Status: DISCONTINUED | OUTPATIENT
Start: 2022-10-05 | End: 2022-10-05 | Stop reason: HOSPADM

## 2022-10-05 RX ORDER — FLUTICASONE PROPIONATE 50 MCG
2 SPRAY, SUSPENSION (ML) NASAL DAILY PRN
Status: DISCONTINUED | OUTPATIENT
Start: 2022-10-05 | End: 2022-10-05 | Stop reason: HOSPADM

## 2022-10-05 RX ORDER — POLYETHYLENE GLYCOL 3350 17 G/17G
17 POWDER, FOR SOLUTION ORAL DAILY
Status: DISCONTINUED | OUTPATIENT
Start: 2022-10-05 | End: 2022-10-05

## 2022-10-05 RX ORDER — FERROUS SULFATE 325(65) MG
325 TABLET ORAL 2 TIMES DAILY
Status: DISCONTINUED | OUTPATIENT
Start: 2022-10-05 | End: 2022-10-05 | Stop reason: HOSPADM

## 2022-10-05 RX ORDER — PANTOPRAZOLE SODIUM 40 MG/1
40 TABLET, DELAYED RELEASE ORAL
Status: DISCONTINUED | OUTPATIENT
Start: 2022-10-05 | End: 2022-10-05 | Stop reason: HOSPADM

## 2022-10-05 RX ORDER — DIPHENHYDRAMINE HCL 25 MG
25 CAPSULE ORAL AT BEDTIME
Status: DISCONTINUED | OUTPATIENT
Start: 2022-10-05 | End: 2022-10-05 | Stop reason: HOSPADM

## 2022-10-05 RX ADMIN — SENNOSIDES AND DOCUSATE SODIUM 1 TABLET: 50; 8.6 TABLET ORAL at 08:07

## 2022-10-05 RX ADMIN — CELECOXIB 100 MG: 100 CAPSULE ORAL at 08:07

## 2022-10-05 RX ADMIN — CEFAZOLIN SODIUM 2 G: 2 INJECTION, SOLUTION INTRAVENOUS at 00:52

## 2022-10-05 RX ADMIN — ACETAMINOPHEN 975 MG: 325 TABLET, FILM COATED ORAL at 07:17

## 2022-10-05 RX ADMIN — POLYETHYLENE GLYCOL 3350 17 G: 17 POWDER, FOR SOLUTION ORAL at 08:07

## 2022-10-05 RX ADMIN — ASPIRIN 325 MG: 325 TABLET, COATED ORAL at 08:07

## 2022-10-05 RX ADMIN — OXYCODONE HYDROCHLORIDE 10 MG: 5 TABLET ORAL at 00:59

## 2022-10-05 ASSESSMENT — ACTIVITIES OF DAILY LIVING (ADL)
ADLS_ACUITY_SCORE: 24

## 2022-10-05 NOTE — PROGRESS NOTES
Patient vital signs are at baseline: Yes  Patient able to ambulate as they were prior to admission or with assist devices provided by therapies during their stay:  Yes  Patient MUST void prior to discharge:  Yes  Patient able to tolerate oral intake:  Yes  Pain has adequate pain control using Oral analgesics:  Yes  Does patient have an identified :  Yes  Has goal D/C date and time been discussed with patient:  Yes     Pt A/O x4. VSS on RA. Up SBA GB. Regular diet. Scheduled tylenol and PRN oxy given for pain. Sling in place, CMS intact. Continent with urinal. Ambulated in gutierrez this shift. Dressing CDI. NWB to RUE.

## 2022-10-05 NOTE — PROGRESS NOTES
10/05/22 0938   Quick Adds   Type of Visit Initial Occupational Therapy Evaluation   Living Environment   People in Home spouse   Current Living Arrangements house  (town house)   Home Accessibility stairs within home;stairs to enter home   Number of Stairs, Main Entrance 2   Number of Stairs, Within Home, Primary greater than 10 stairs   Living Environment Comments walk in shower-has suction cup grab bar.   Self-Care   Usual Activity Tolerance good   Current Activity Tolerance moderate   Regular Exercise Yes   Activity/Exercise Type walking   Exercise Amount/Frequency daily   Equipment Currently Used at Home none   Fall history within last six months no   Instrumental Activities of Daily Living (IADL)   Previous Responsibilities shopping;driving;medication management;finances  (helps with cooking,)   General Information   Onset of Illness/Injury or Date of Surgery 10/04/22   Referring Physician Lonny Deng PA-C   Patient/Family Therapy Goal Statement (OT) to get home   Additional Occupational Profile Info/Pertinent History of Current Problem R TSA   Existing Precautions/Restrictions fall;shoulder   Right Upper Extremity (Weight-bearing Status) non weight-bearing (NWB)   General Observations and Info pt sitting up in the chair, agreeable to OT eval. wife present   Cognitive Status Examination   Orientation Status orientation to person, place and time   Visual Perception   Visual Impairment/Limitations WNL   Sensory   Sensory Quick Adds No deficits were identified   Pain Assessment   Patient Currently in Pain Yes, see Vital Sign flowsheet   Integumentary/Edema   Integumentary/Edema Comments normal surgical swelling   Posture   Posture not impaired   Range of Motion Comprehensive   Comment, General Range of Motion L UE WFL   Strength Comprehensive (MMT)   Comment, General Manual Muscle Testing (MMT) Assessment R NT, L UE WFL   Muscle Tone Assessment   Muscle Tone Quick Adds No deficits were identified    Coordination   Upper Extremity Coordination Right UE impaired   Bed Mobility   Comment (Bed Mobility) not observed, up in chair and back to chair at end of session   Transfers   Transfer Comments SBA with all mobility and BR transfers   Balance   Balance Assessment no deficits were identified   Activities of Daily Living   BADL Assessment/Intervention upper body dressing;grooming   Upper Body Dressing Assessment/Training   Ducor Level (Upper Body Dressing) maximum assist (25% patient effort)   Grooming Assessment/Training   Ducor Level (Grooming) minimum assist (75% patient effort)   Clinical Impression   Criteria for Skilled Therapeutic Interventions Met (OT) Yes, treatment indicated   OT Diagnosis decreased I with ADLs and functional mobility   OT Problem List-Impairments impacting ADL problems related to;activity tolerance impaired;post-surgical precautions;pain;flexibility   Assessment of Occupational Performance 1-3 Performance Deficits   Identified Performance Deficits decreased dressing, home mgmt   Planned Therapy Interventions (OT) ADL retraining;transfer training   Clinical Decision Making Complexity (OT) low complexity   Risk & Benefits of therapy have been explained evaluation/treatment results reviewed;care plan/treatment goals reviewed;risks/benefits reviewed;current/potential barriers reviewed;participants voiced agreement with care plan;participants included;patient;spouse/significant other   OT Discharge Planning   OT Rationale for DC Rec OT: eval completed and treatment initiated. pt has very supportive wife who will assist as needed. pt was able to demo mod I with mobility, does demo some decreased activity tolerance becoming dizzy after activity, and needs min A with dressing. anticpate he will progress to PLOF.   Therapy Certification   Start of Care Date 10/05/22   Certification date from 10/05/22   Certification date to 10/05/22   Medical Diagnosis R TSA   Total Evaluation Time  (Minutes)   Total Evaluation Time (Minutes) 10   OT Goals   Therapy Frequency (OT) One time eval and treatment   OT Predicted Duration/Target Date for Goal Attainment 10/05/22   OT Goals Hygiene/Grooming;Lower Body Dressing;Upper Body Dressing;Toilet Transfer/Toileting;OT Goal 1   OT: Hygiene/Grooming supervision/stand-by assist;while standing;Goal Met   OT: Upper Body Dressing Minimal assist;within precautions;Goal Met   OT: Lower Body Dressing Minimal assist;Goal Met   OT: Toilet Transfer/Toileting Modified independent;toilet transfer;cleaning and garment management;Goal Met   OT: Goal 1 demo I with shoulder protocal ex's.  (goal met)

## 2022-10-05 NOTE — PROGRESS NOTES
[facility]      OUTPATIENT OCCUPATIONAL THERAPY  EVALUATION  PLAN OF TREATMENT FOR OUTPATIENT REHABILITATION  (COMPLETE FOR INITIAL CLAIMS ONLY)  Patient's Last Name, First Name, M.I.  YOB: 1945  Elvis Abad                          Provider's Name  [facility] Medical Record No.  [id number]                               Onset Date:  10/04/22   Start of Care Date:  (P) 10/05/22     Type:     ___PT   _X_OT   ___SLP Medical Diagnosis:  (P) R TSA                        OT Diagnosis:  decreased I with ADLs and functional mobility   Visits from SOC:  1   _________________________________________________________________________________  Plan of Treatment/Functional Goals    Planned Interventions:     Goals: See Occupational Therapy Goals on Care Plan in [employer] electronic health record.    Therapy Frequency:    Predicted Duration of Therapy Intervention:    _________________________________________________________________________________    I CERTIFY THE NEED FOR THESE SERVICES FURNISHED UNDER        THIS PLAN OF TREATMENT AND WHILE UNDER MY CARE     (Physician co-signature of this document indicates review and certification of the therapy plan).              Certification date from: (P) 10/05/22, Certification date to: (P) 10/05/22    Referring Physician: Lonny Deng PA-C            Initial Assessment        See Occupational Therapy evaluation dated (P) 10/05/22 in Epic electronic health record.

## 2022-10-05 NOTE — PROGRESS NOTES
"ORTHOPEDIC UPPER EXTREMITY PROGRESS NOTE    POD# 1  Patient is a 77 year old male who underwent Procedure(s):  RIGHT OPEN TOTAL SHOULDER ARTHROPLASTY on 10/4/2022. Pain is well controlled. Tolerating medication well, no nausea or vomiting.  OT here with patient now. Discharge instructions reviewed.    Vitals:   Blood pressure 98/61, pulse 74, temperature 98.2  F (36.8  C), temperature source Oral, resp. rate 14, height 1.778 m (5' 10\"), weight 88.5 kg (195 lb), SpO2 94 %.  Temp (24hrs), Av.9  F (36.1  C), Min:93.8  F (34.3  C), Max:98.2  F (36.8  C)      EXAM   The patient is awake and alert.   Sensation is intact.  Digital Flexion/Extension maintained.   Brisk cap refill.   The incision is covered.    Labs:   Recent Labs   Lab Test 10/05/22  0724 22  1200   HGB 9.3* 13.6       ASSESSMENT  S/p R TSA   PLAN  1. DVT prophylaxis: anticoagulants: aspirin  2. Weight Bearing NWB (Non weight bearing).  3. Wound Care leave undisturbed  4. Discharge anticipated date today Home with No Skilled Service  5. Cont Pain Control Oxycodone, Tylenol and Celebrex  6. Continue with shoulder immobilizer.  Okay to remove for ADLs.  Okay for active range of motion for elbow, wrist and digits.    Imelda Short PA-C  [facility]      "

## 2022-10-05 NOTE — PLAN OF CARE
Goal Outcome Evaluation:  Pt is discharging home with wife around 1100. Discharge instructions and medications reviewed with patient and verbalized understanding. IV removed and patient belongs accounted for.

## 2022-10-14 NOTE — ADDENDUM NOTE
Addendum  created 10/14/22 0839 by Cristal Jung MD    Clinical Note Signed, Diagnosis association updated, Intraprocedure Blocks edited

## 2022-12-18 ENCOUNTER — RX ONLY (RX ONLY)
Age: 77
End: 2022-12-18

## 2023-02-04 ENCOUNTER — HEALTH MAINTENANCE LETTER (OUTPATIENT)
Age: 78
End: 2023-02-04

## 2023-12-23 ENCOUNTER — HEALTH MAINTENANCE LETTER (OUTPATIENT)
Age: 78
End: 2023-12-23

## 2024-10-24 ENCOUNTER — HOSPITAL ENCOUNTER (INPATIENT)
Facility: CLINIC | Age: 79
LOS: 2 days | Discharge: HOME | DRG: 378 | End: 2024-10-26
Attending: EMERGENCY MEDICINE | Admitting: HOSPITALIST
Payer: COMMERCIAL

## 2024-10-24 DIAGNOSIS — D62 ANEMIA DUE TO BLOOD LOSS, ACUTE: ICD-10-CM

## 2024-10-24 DIAGNOSIS — Z98.890 POST-OPERATIVE STATE: ICD-10-CM

## 2024-10-24 DIAGNOSIS — K92.2 UGIB (UPPER GASTROINTESTINAL BLEED): ICD-10-CM

## 2024-10-24 LAB
ABO/RH(D): NORMAL
ANION GAP SERPL CALCULATED.3IONS-SCNC: 10 MMOL/L (ref 7–15)
ANTIBODY SCREEN: NEGATIVE
ATRIAL RATE - MUSE: 59 BPM
BASOPHILS # BLD AUTO: 0 10E3/UL (ref 0–0.2)
BASOPHILS NFR BLD AUTO: 1 %
BLD PROD TYP BPU: NORMAL
BLOOD COMPONENT TYPE: NORMAL
BUN SERPL-MCNC: 12.5 MG/DL (ref 8–23)
CALCIUM SERPL-MCNC: 8.5 MG/DL (ref 8.8–10.4)
CHLORIDE SERPL-SCNC: 98 MMOL/L (ref 98–107)
CODING SYSTEM: NORMAL
CREAT SERPL-MCNC: 1.02 MG/DL (ref 0.67–1.17)
CROSSMATCH: NORMAL
DIASTOLIC BLOOD PRESSURE - MUSE: NORMAL MMHG
EGFRCR SERPLBLD CKD-EPI 2021: 74 ML/MIN/1.73M2
EOSINOPHIL # BLD AUTO: 0.4 10E3/UL (ref 0–0.7)
EOSINOPHIL NFR BLD AUTO: 8 %
ERYTHROCYTE [DISTWIDTH] IN BLOOD BY AUTOMATED COUNT: 14.1 % (ref 10–15)
GLUCOSE SERPL-MCNC: 96 MG/DL (ref 70–99)
HCO3 SERPL-SCNC: 21 MMOL/L (ref 22–29)
HCT VFR BLD AUTO: 28.8 % (ref 40–53)
HGB BLD-MCNC: 8.5 G/DL (ref 13.3–17.7)
HOLD SPECIMEN: NORMAL
IMM GRANULOCYTES # BLD: 0 10E3/UL
IMM GRANULOCYTES NFR BLD: 0 %
INTERPRETATION ECG - MUSE: NORMAL
ISSUE DATE AND TIME: NORMAL
LYMPHOCYTES # BLD AUTO: 1.2 10E3/UL (ref 0.8–5.3)
LYMPHOCYTES NFR BLD AUTO: 22 %
MCH RBC QN AUTO: 24.7 PG (ref 26.5–33)
MCHC RBC AUTO-ENTMCNC: 29.5 G/DL (ref 31.5–36.5)
MCV RBC AUTO: 84 FL (ref 78–100)
MONOCYTES # BLD AUTO: 0.7 10E3/UL (ref 0–1.3)
MONOCYTES NFR BLD AUTO: 13 %
NEUTROPHILS # BLD AUTO: 3.2 10E3/UL (ref 1.6–8.3)
NEUTROPHILS NFR BLD AUTO: 57 %
NRBC # BLD AUTO: 0 10E3/UL
NRBC BLD AUTO-RTO: 0 /100
P AXIS - MUSE: 49 DEGREES
PLATELET # BLD AUTO: 338 10E3/UL (ref 150–450)
POTASSIUM SERPL-SCNC: 4.7 MMOL/L (ref 3.4–5.3)
PR INTERVAL - MUSE: 174 MS
QRS DURATION - MUSE: 88 MS
QT - MUSE: 410 MS
QTC - MUSE: 405 MS
R AXIS - MUSE: 21 DEGREES
RBC # BLD AUTO: 3.44 10E6/UL (ref 4.4–5.9)
SODIUM SERPL-SCNC: 129 MMOL/L (ref 135–145)
SPECIMEN EXPIRATION DATE: NORMAL
SYSTOLIC BLOOD PRESSURE - MUSE: NORMAL MMHG
T AXIS - MUSE: 32 DEGREES
UNIT ABO/RH: NORMAL
UNIT NUMBER: NORMAL
UNIT STATUS: NORMAL
UNIT TYPE ISBT: 5100
VENTRICULAR RATE- MUSE: 59 BPM
WBC # BLD AUTO: 5.7 10E3/UL (ref 4–11)

## 2024-10-24 PROCEDURE — 120N000001 HC R&B MED SURG/OB

## 2024-10-24 PROCEDURE — 250N000009 HC RX 250: Performed by: EMERGENCY MEDICINE

## 2024-10-24 PROCEDURE — 86923 COMPATIBILITY TEST ELECTRIC: CPT | Performed by: HOSPITALIST

## 2024-10-24 PROCEDURE — 80048 BASIC METABOLIC PNL TOTAL CA: CPT | Performed by: EMERGENCY MEDICINE

## 2024-10-24 PROCEDURE — 99222 1ST HOSP IP/OBS MODERATE 55: CPT | Performed by: HOSPITALIST

## 2024-10-24 PROCEDURE — 86901 BLOOD TYPING SEROLOGIC RH(D): CPT | Performed by: EMERGENCY MEDICINE

## 2024-10-24 PROCEDURE — 36415 COLL VENOUS BLD VENIPUNCTURE: CPT | Performed by: EMERGENCY MEDICINE

## 2024-10-24 PROCEDURE — 85025 COMPLETE CBC W/AUTO DIFF WBC: CPT | Performed by: EMERGENCY MEDICINE

## 2024-10-24 PROCEDURE — 96374 THER/PROPH/DIAG INJ IV PUSH: CPT

## 2024-10-24 PROCEDURE — 250N000009 HC RX 250: Performed by: HOSPITALIST

## 2024-10-24 PROCEDURE — 86900 BLOOD TYPING SEROLOGIC ABO: CPT | Performed by: EMERGENCY MEDICINE

## 2024-10-24 PROCEDURE — 250N000013 HC RX MED GY IP 250 OP 250 PS 637: Performed by: HOSPITALIST

## 2024-10-24 PROCEDURE — 93005 ELECTROCARDIOGRAM TRACING: CPT

## 2024-10-24 PROCEDURE — P9016 RBC LEUKOCYTES REDUCED: HCPCS | Performed by: HOSPITALIST

## 2024-10-24 PROCEDURE — 99285 EMERGENCY DEPT VISIT HI MDM: CPT | Mod: 25

## 2024-10-24 RX ORDER — ROSUVASTATIN CALCIUM 10 MG/1
10 TABLET, COATED ORAL AT BEDTIME
COMMUNITY
Start: 2023-07-13

## 2024-10-24 RX ORDER — AMOXICILLIN 250 MG
1 CAPSULE ORAL 2 TIMES DAILY PRN
Status: DISCONTINUED | OUTPATIENT
Start: 2024-10-24 | End: 2024-10-26 | Stop reason: HOSPADM

## 2024-10-24 RX ORDER — CALCIUM CARBONATE 500 MG/1
1000 TABLET, CHEWABLE ORAL 4 TIMES DAILY PRN
Status: DISCONTINUED | OUTPATIENT
Start: 2024-10-24 | End: 2024-10-26 | Stop reason: HOSPADM

## 2024-10-24 RX ORDER — ACETAMINOPHEN 325 MG/1
650 TABLET ORAL EVERY 4 HOURS PRN
Status: DISCONTINUED | OUTPATIENT
Start: 2024-10-24 | End: 2024-10-26 | Stop reason: HOSPADM

## 2024-10-24 RX ORDER — ONDANSETRON 4 MG/1
4 TABLET, ORALLY DISINTEGRATING ORAL EVERY 6 HOURS PRN
Status: DISCONTINUED | OUTPATIENT
Start: 2024-10-24 | End: 2024-10-26 | Stop reason: HOSPADM

## 2024-10-24 RX ORDER — MULTIPLE VITAMINS W/ MINERALS TAB 9MG-400MCG
1 TAB ORAL DAILY
Status: DISCONTINUED | OUTPATIENT
Start: 2024-10-25 | End: 2024-10-26 | Stop reason: HOSPADM

## 2024-10-24 RX ORDER — LIDOCAINE 40 MG/G
CREAM TOPICAL
Status: DISCONTINUED | OUTPATIENT
Start: 2024-10-24 | End: 2024-10-26 | Stop reason: HOSPADM

## 2024-10-24 RX ORDER — AMOXICILLIN 250 MG
2 CAPSULE ORAL 2 TIMES DAILY PRN
Status: DISCONTINUED | OUTPATIENT
Start: 2024-10-24 | End: 2024-10-26 | Stop reason: HOSPADM

## 2024-10-24 RX ORDER — FERROUS SULFATE 325(65) MG
325 TABLET ORAL 2 TIMES DAILY
Status: DISCONTINUED | OUTPATIENT
Start: 2024-10-24 | End: 2024-10-26 | Stop reason: HOSPADM

## 2024-10-24 RX ORDER — ONDANSETRON 2 MG/ML
4 INJECTION INTRAMUSCULAR; INTRAVENOUS EVERY 6 HOURS PRN
Status: DISCONTINUED | OUTPATIENT
Start: 2024-10-24 | End: 2024-10-26 | Stop reason: HOSPADM

## 2024-10-24 RX ADMIN — PANTOPRAZOLE SODIUM 40 MG: 40 INJECTION, POWDER, FOR SOLUTION INTRAVENOUS at 15:25

## 2024-10-24 RX ADMIN — FERROUS SULFATE TAB 325 MG (65 MG ELEMENTAL FE) 325 MG: 325 (65 FE) TAB at 21:12

## 2024-10-24 RX ADMIN — PANTOPRAZOLE SODIUM 40 MG: 40 INJECTION, POWDER, FOR SOLUTION INTRAVENOUS at 21:12

## 2024-10-24 ASSESSMENT — ACTIVITIES OF DAILY LIVING (ADL)
HEARING_DIFFICULTY_OR_DEAF: YES
DESCRIBE_HEARING_LOSS: BILATERAL HEARING LOSS
ADLS_ACUITY_SCORE: 0
WERE_AUXILIARY_AIDS_OFFERED?: NO
DRESSING/BATHING_DIFFICULTY: NO
ADLS_ACUITY_SCORE: 0
ADLS_ACUITY_SCORE: 0
CHANGE_IN_FUNCTIONAL_STATUS_SINCE_ONSET_OF_CURRENT_ILLNESS/INJURY: NO
TOILETING_ISSUES: NO
DOING_ERRANDS_INDEPENDENTLY_DIFFICULTY: NO
HEARING_DIFFICULTY_OR_DEAF: NO
WALKING_OR_CLIMBING_STAIRS_DIFFICULTY: NO
FALL_HISTORY_WITHIN_LAST_SIX_MONTHS: YES
ADLS_ACUITY_SCORE: 0
DIFFICULTY_EATING/SWALLOWING: NO
ADLS_ACUITY_SCORE: 0
WEAR_GLASSES_OR_BLIND: NO
ADLS_ACUITY_SCORE: 0
DIFFICULTY_COMMUNICATING: NO
NUMBER_OF_TIMES_PATIENT_HAS_FALLEN_WITHIN_LAST_SIX_MONTHS: 1
CONCENTRATING,_REMEMBERING_OR_MAKING_DECISIONS_DIFFICULTY: NO

## 2024-10-24 ASSESSMENT — COLUMBIA-SUICIDE SEVERITY RATING SCALE - C-SSRS
2. HAVE YOU ACTUALLY HAD ANY THOUGHTS OF KILLING YOURSELF IN THE PAST MONTH?: NO
1. IN THE PAST MONTH, HAVE YOU WISHED YOU WERE DEAD OR WISHED YOU COULD GO TO SLEEP AND NOT WAKE UP?: NO
6. HAVE YOU EVER DONE ANYTHING, STARTED TO DO ANYTHING, OR PREPARED TO DO ANYTHING TO END YOUR LIFE?: NO

## 2024-10-24 NOTE — ED TRIAGE NOTES
Pt arrives from  for concerns of low hemoglobin. Pt was seen on Tuesday where hem was 8.1, endorses worsening dizziness since then. Hx of GI bleed in the past but has never needed a blood transfusion. Pt currently on iron supplement so is unsure of changing in stool color.      Triage Assessment (Adult)       Row Name 10/24/24 1202          Triage Assessment    Airway WDL WDL        Respiratory WDL    Respiratory WDL WDL        Cardiac WDL    Cardiac WDL X;all        Chest Pain Assessment    Associated Signs/Symptoms dizziness

## 2024-10-24 NOTE — PHARMACY-ADMISSION MEDICATION HISTORY
Pharmacist Admission Medication History    Admission medication history is complete. The information provided in this note is only as accurate as the sources available at the time of the update.    Information Source(s): Patient via in-person    Pertinent Information:     Changes made to PTA medication list:  Added: Rosuvastatin  Deleted: Oxycodone prn, Senna/S, Celebrex  Changed: None    Allergies reviewed with patient and updates made in EHR: no    Medication History Completed By: Ti Negrete RPH 10/24/2024 6:44 PM    PTA Med List   Medication Sig Last Dose/Taking    acetaminophen (TYLENOL) 325 MG tablet Take 2 tablets (650 mg) by mouth every 4 hours as needed for other (mild pain) Taking As Needed    aspirin (ASA) 325 MG EC tablet Take 1 tablet (325 mg) by mouth daily 10/24/2024 Morning    cetirizine (ZYRTEC) 10 MG tablet Take 10 mg by mouth daily 10/24/2024 Morning    diphenhydrAMINE (BENADRYL) 25 MG tablet Take 25 mg by mouth At Bedtime 10/23/2024 Bedtime    esomeprazole (NEXIUM) 40 MG DR capsule Take 40 mg by mouth 2 times daily 10/24/2024 Morning    ferrous sulfate (FEROSUL) 325 (65 Fe) MG tablet Take 325 mg by mouth 2 times daily 10/23/2024    fish oil-omega-3 fatty acids 1000 MG capsule Take 1 g by mouth daily 10/24/2024 Morning    fluticasone (FLONASE) 50 MCG/ACT nasal spray Spray 2 sprays into both nostrils daily as needed Taking As Needed    multivitamin w/minerals (THERA-VIT-M) tablet Take 1 tablet by mouth daily 10/24/2024 Morning    polyethylene glycol (MIRALAX) 17 GM/Dose powder Take 1 Capful by mouth daily. 10/24/2024 Morning    polyethylene glycol-propylene glycol (SYSTANE ULTRA) 0.4-0.3 % SOLN ophthalmic solution Place 1 drop into both eyes 2 times daily 10/23/2024    rosuvastatin (CRESTOR) 10 MG tablet Take 10 mg by mouth at bedtime. 10/23/2024 Bedtime    vitamin D3 (CHOLECALCIFEROL) 50 mcg (2000 units) tablet Take 1 tablet by mouth daily 10/24/2024 Morning

## 2024-10-24 NOTE — ED PROVIDER NOTES
"  Emergency Department Note      History of Present Illness     Chief Complaint   Dizziness      HPI   Elvis Abad is a 79 year old male with history of anemia, GERD, and GI bleed who presents for concerns of low hemoglobin. Patient reports he has been dizzy, lightheaded, nauseated, cold, and very weak. Chest feels heavy and short of breath. Symptoms have been persistent for past 6 weeks but worsened today. Patient's wife states he has been confused, \"foggy\", and \"can't focus\". She also reports he has not been eating much lately and expresses fear of possibility of falls. No LOC. Patient had a GI bleed in 2022 but states this experience is worse. He is on iron supplements so is he is unsure if stool color has changed, but notes his stool is black. He recently started Nexium.     Independent Historian   None    Review of External Notes   Reviewed office visit from earlier today as well as 2 days ago.    Past Medical History     Medical History and Problem List   Past Medical History:   Diagnosis Date    Anemia     Gastroesophageal reflux disease     TIA (transient ischemic attack)     Vitamin D deficiency        Medications   acetaminophen (TYLENOL) 325 MG tablet  aspirin (ASA) 325 MG EC tablet  celecoxib (CELEBREX) 200 MG capsule  cetirizine (ZYRTEC) 10 MG tablet  diphenhydrAMINE (BENADRYL) 25 MG tablet  esomeprazole (NEXIUM) 40 MG DR capsule  ferrous sulfate (FEROSUL) 325 (65 Fe) MG tablet  fish oil-omega-3 fatty acids 1000 MG capsule  fluticasone (FLONASE) 50 MCG/ACT nasal spray  multivitamin w/minerals (THERA-VIT-M) tablet  oxyCODONE (ROXICODONE) 5 MG tablet  polyethylene glycol (MIRALAX) 17 GM/Dose powder  polyethylene glycol-propylene glycol (SYSTANE ULTRA) 0.4-0.3 % SOLN ophthalmic solution  senna-docusate (SENOKOT-S/PERICOLACE) 8.6-50 MG tablet  vitamin D3 (CHOLECALCIFEROL) 50 mcg (2000 units) tablet        Surgical History   Past Surgical History:   Procedure Laterality Date    APPENDECTOMY      " "ARTHROPLASTY SHOULDER Right 10/4/2022    Procedure: RIGHT OPEN TOTAL SHOULDER ARTHROPLASTY;  Surgeon: Jarrett Chaidez MD;  Location: [facility] OR    BACK SURGERY      COLONOSCOPY      EYE SURGERY      cateracts    GENITOURINARY SURGERY      vasectomy       Physical Exam     Patient Vitals for the past 24 hrs:   BP Temp Temp src Pulse Resp SpO2 Height Weight   10/24/24 1159 (!) 146/99 97.3  F (36.3  C) Temporal 67 18 98 % 1.753 m (5' 9\") 88.8 kg (195 lb 12.3 oz)     Physical Exam  Nursing note and vitals reviewed.  HENT:   Mouth/Throat: Moist mucous membranes.   Eyes: EOMI, nonicteric sclera  Cardiovascular: Normal rate, regular rhythm, no murmurs, rubs, or gallops  Pulmonary/Chest: Effort normal and breath sounds normal. No respiratory distress. No wheezes. No rales.   Abdominal: Soft. Nontender, nondistended, no guarding or rigidity.   Musculoskeletal: Normal range of motion.   Neurological: Alert. Moves all extremities spontaneously.   Skin: Skin is warm and dry. No rash noted.         Diagnostics     Lab Results   Labs Ordered and Resulted from Time of ED Arrival to Time of ED Departure   BASIC METABOLIC PANEL - Abnormal       Result Value    Sodium 129 (*)     Potassium 4.7      Chloride 98      Carbon Dioxide (CO2) 21 (*)     Anion Gap 10      Urea Nitrogen 12.5      Creatinine 1.02      GFR Estimate 74      Calcium 8.5 (*)     Glucose 96     CBC WITH PLATELETS AND DIFFERENTIAL - Abnormal    WBC Count 5.7      RBC Count 3.44 (*)     Hemoglobin 8.5 (*)     Hematocrit 28.8 (*)     MCV 84      MCH 24.7 (*)     MCHC 29.5 (*)     RDW 14.1      Platelet Count 338      % Neutrophils 57      % Lymphocytes 22      % Monocytes 13      % Eosinophils 8      % Basophils 1      % Immature Granulocytes 0      NRBCs per 100 WBC 0      Absolute Neutrophils 3.2      Absolute Lymphocytes 1.2      Absolute Monocytes 0.7      Absolute Eosinophils 0.4      Absolute Basophils 0.0      Absolute Immature Granulocytes 0.0      Absolute NRBCs " 0.0     TYPE AND SCREEN, ADULT    ABO/RH(D) O POS      Antibody Screen Negative      SPECIMEN EXPIRATION DATE [id number]     ABO/RH TYPE AND SCREEN       Imaging   No orders to display       EKG   ECG results from 10/24/24   EKG 12-lead, tracing only     Value    Systolic Blood Pressure     Diastolic Blood Pressure     Ventricular Rate 59    Atrial Rate 59    UT Interval 174    QRS Duration 88        QTc 405    P Axis 49    R AXIS 21    T Axis 32    Interpretation ECG      Sinus bradycardia  Otherwise normal ECG  When compared with ECG of 11-Apr-2022 12:02,  No significant change was found       Independent Interpretation   None    ED Course      Medications Administered   Medications   acetaminophen (TYLENOL) tablet 650 mg (has no administration in time range)   ferrous sulfate (FEROSUL) tablet 325 mg (has no administration in time range)   multivitamin w/minerals (THERA-VIT-M) tablet 1 tablet (has no administration in time range)   lidocaine 1 % 0.1-1 mL (has no administration in time range)   lidocaine (LMX4) cream (has no administration in time range)   sodium chloride (PF) 0.9% PF flush 3 mL ( Intracatheter Canceled Entry 10/24/24 1747)   sodium chloride (PF) 0.9% PF flush 3 mL (has no administration in time range)   senna-docusate (SENOKOT-S/PERICOLACE) 8.6-50 MG per tablet 1 tablet (has no administration in time range)     Or   senna-docusate (SENOKOT-S/PERICOLACE) 8.6-50 MG per tablet 2 tablet (has no administration in time range)   calcium carbonate (TUMS) chewable tablet 1,000 mg (has no administration in time range)   melatonin tablet 1 mg (has no administration in time range)   ondansetron (ZOFRAN ODT) ODT tab 4 mg (has no administration in time range)     Or   ondansetron (ZOFRAN) injection 4 mg (has no administration in time range)   pantoprazole (PROTONIX) IV push injection 40 mg (has no administration in time range)   pantoprazole (PROTONIX) IV push injection 40 mg (40 mg Intravenous $Given  10/24/24 1525)         Discussion of Management   Admitting Hospitalist, Dr. Umanzor        Additional Documentation  None    Medical Decision Making / Diagnosis     CMS Diagnoses: None    MIPS       None    MDM   Elvis Abad is a 79 year old male who presents with chief complaint dizziness/fatigue.  He was noted to have low hemoglobin in clinic a couple days ago.  Patient has history of GI bleed 2 years ago.  His hemoglobin was 13 in May, now 8.  He reports dark stools, though takes iron chronically.  No other laboratory abnormalities.  No concerns for infection.  Overall, presentation is consistent with anemia most likely due to upper GI bleed.  Admission indicated for further evaluation and treatment with likely EGD.  Patient/wife in agreement with this plan.  Discussed with hospitalist, Dr. Umanzor, who accepts patient for admission.    Disposition   The patient was admitted to the hospital.     Diagnosis     ICD-10-CM    1. UGIB (upper gastrointestinal bleed)  K92.2       2. Anemia due to blood loss, acute  D62              Scribe Disclosure:  Irene DHILLON, am serving as a scribe at 12:52 PM on 10/24/2024 to document services personally performed by Caden Shane MD based on my observations and the provider's statements to me.        Caden Shane MD  10/24/24 1947

## 2024-10-24 NOTE — PLAN OF CARE
"Goal Outcome Evaluation:    Pertinent assessments: Pt A&Ox4. [place]. VSS, on RA. Up SBA. LS clear. BS active. Denies pain and nausea. PIV SL. C/o dizziness.     Major Shift Events admitted to unit, Blood transfusion  Treatment Plan: monitor Hgb and sodium, NPO at midnight. Monitor stools  Bedside Nurse: July Grimes RN       Problem: Adult Inpatient Plan of Care  Goal: Plan of Care Review  Description: The Plan of Care Review/Shift note should be completed every shift.  The Outcome Evaluation is a brief statement about your assessment that the patient is improving, declining, or no change.  This information will be displayed automatically on your shift  note.  Outcome: Progressing  Flowsheets (Taken 10/24/2024 1825)  Outcome Evaluation: Pt denies pain, blood transfusion.  Plan of Care Reviewed With: patient  Overall Patient Progress: no change  Goal: Patient-Specific Goal (Individualized)  Description: You can add care plan individualizations to a care plan. Examples of Individualization might be:  \"Parent requests to be called daily at 9am for status\", \"I have a hard time hearing out of my right ear\", or \"Do not touch me to wake me up as it startles  me\".  Outcome: Progressing  Goal: Absence of Hospital-Acquired Illness or Injury  Outcome: Progressing  Intervention: Identify and Manage Fall Risk  Recent Flowsheet Documentation  Taken 10/24/2024 1701 by July Grimes RN  Safety Promotion/Fall Prevention:   activity supervised   nonskid shoes/slippers when out of bed   room near nurse's station   safety round/check completed  Intervention: Prevent Skin Injury  Recent Flowsheet Documentation  Taken 10/24/2024 1701 by July Grimes RN  Body Position: position changed independently  Intervention: Prevent and Manage VTE (Venous Thromboembolism) Risk  Recent Flowsheet Documentation  Taken 10/24/2024 1701 by July Grimes RN  VTE Prevention/Management: SCDs off (sequential compression " devices)  Intervention: Prevent Infection  Recent Flowsheet Documentation  Taken 10/24/2024 1701 by July Grimes RN  Infection Prevention: cohorting utilized  Goal: Optimal Comfort and Wellbeing  Outcome: Progressing  Goal: Readiness for Transition of Care  Outcome: Progressing  Intervention: Mutually Develop Transition Plan  Recent Flowsheet Documentation  Taken 10/24/2024 1654 by July Grimes RN  Equipment Currently Used at Home: none     Problem: Skin Injury Risk Increased  Goal: Skin Health and Integrity  Outcome: Progressing  Intervention: Optimize Skin Protection  Recent Flowsheet Documentation  Taken 10/24/2024 1701 by July Grimes RN  Activity Management: activity adjusted per tolerance  Head of Bed (HOB) Positioning: HOB at 30-45 degrees     Problem: Anemia  Goal: Anemia Symptom Improvement  Outcome: Progressing  Intervention: Monitor and Manage Anemia  Recent Flowsheet Documentation  Taken 10/24/2024 1701 by July Grimes RN  Safety Promotion/Fall Prevention:   activity supervised   nonskid shoes/slippers when out of bed   room near nurse's station   safety round/check completed     Problem: Gastrointestinal Bleeding  Goal: Optimal Coping with Acute Illness  Outcome: Progressing  Goal: Hemostasis  Outcome: Progressing         Plan of Care Reviewed With: patient    Overall Patient Progress: no changeOverall Patient Progress: no change    Outcome Evaluation: Pt denies pain, blood transfusion.

## 2024-10-24 NOTE — H&P
[facility]    History and Physical - Hospitalist Service       Date of Admission:  10/24/2024    Assessment & Plan     Elvis Abad is a 79 year old male w/PMH anemia, large hiatal hernia w/alia ulcers, gastritis, hemorrhoids, TIA, GERD who presents from GI office with symptomatic anemia and suspected GI bleeding    Symptomatic anemia   Suspected GI bleeding  Hx large hiatal hernia w/alia ulcers, gastritis, hemorrhoids, GERD  -seen by GI in 2022 and and findings on EGD, colonoscopy and pill cam as noted above. Was on nexium and iron supplements with Hgb recovering to 13 range. However over past 3-6 weeks has had increasing sob, exertional dyspnea, orthostasis, dizziness and was found to have Hgb of 8.1 yesterday. He is on  for TIA and takes celebrex, drinks one alcoholic beverage daily. . Takes iron so stools always dark. PCP had doubled nexium earlier this week with no change.  -Went to [facility] and was sent to ED. In ED Hgb is 8.5 but remains very symptomatic  -suspect has subacute to chronic bleed related to underlying GI findings above and NSAIDS  -hold NSAIDS and place on IV protonix 40 BID, FOBT, serial labs  -consult GI and keep NPO after midnight  -discussed with patient and will transfuse 1 unit PRBC's due to severity of symptoms    HX TIA  -hold ASA, may benefit from discussing with neuro baby ASA instead of full    Hyponatremia  -129, ? Poor intake. serial labs     Diet:  regular then NPO at midnight  DVT Prophylaxis: Pneumatic Compression Devices  Jo Catheter: Not present  Lines: None     Cardiac Monitoring: None  Code Status:  full code         Edward Umanzor DO  Hospitalist Service  [facility]  Securely message with [facility] (more info)  Text page via [employer] Paging/Directory     ______________________________________________________________________    Chief Complaint   Dizziness, sob    History of Present Illness   Elvis Abad is a 79  "year old male w/PMH anemia, large hiatal hernia w/lars ulcers, gastritis, hemorrhoids, TIA, GERD who presents from GI office with symptomatic anemia. He was evaluated in 2022 by [facility] for suspected GI bleeding with note from today describing findings at that time:    \"Saulo Leal, is a pleasant 79-year-old male who presents today for evaluation of anemia. We were asked to see this patient in consultation by his primary care provider aJzz Jett MD.Patient was seen by our group in 2022 for evaluation of anemia. At that time he underwent an upper endoscopy, colonoscopy, and small bowel PillCam. Upper endoscopy 8/19/2022 revealed a large hiatal hernia with a few Lars ulcers, mildly tortuous esophagus, and patchy mild erythematous mucosa in the gastric antrum. Biopsies of the esophagus were normal and negative for reflux changes or EOE. Biopsies of the stomach showed reactive gastropathy and were negative for H pylori. Biopsies of the duodenum were normal and negative for celiac disease.Colonoscopy 8/29/2022 showed normal-appearing terminal ileum, external and internal hemorrhoids grade III, and normal-appearing colon.PillCam 9/12/2022 noted erythematous mucosa in the distal stomach and proximal duodenum, solitary red spot, and no active bleeding\"    He has been taking nexium and iron supplements and Hgb had gotten back to 13 range. However over past 3-6 weeks has noticed increasing sob, exertional dyspnea, dizziness and orthostatic type sensations. He does take  for hx of TIA and has been taking celebrex in addition although not daily, usually before golfing, drinks one ounce of liquor daily. Has been on nexium daily but PCP increased to BID earlier this week. Has been having mild epigastric discomfort, nausea. Takes iron so stools are always dark. Had lab work showing Hgb down to 8.1 yesterday, on recheck today is 8.5 but feels quite symptomatic and was sent from GI clinic to ED for " further evaluation.     Discussed with him and he is agreeable to getting unit of blood for his symptomatic anemia,      Past Medical History    Past Medical History:   Diagnosis Date    Anemia     Gastroesophageal reflux disease     TIA (transient ischemic attack)     Vitamin D deficiency        Past Surgical History   Past Surgical History:   Procedure Laterality Date    APPENDECTOMY      ARTHROPLASTY SHOULDER Right 10/4/2022    Procedure: RIGHT OPEN TOTAL SHOULDER ARTHROPLASTY;  Surgeon: Jarrett Chaidez MD;  Location: [facility] OR    BACK SURGERY      COLONOSCOPY      EYE SURGERY      cateracts    GENITOURINARY SURGERY      vasectomy       Prior to Admission Medications   Prior to Admission Medications   Prescriptions Last Dose Informant Patient Reported? Taking?   acetaminophen (TYLENOL) 325 MG tablet   No No   Sig: Take 2 tablets (650 mg) by mouth every 4 hours as needed for other (mild pain)   aspirin (ASA) 325 MG EC tablet   No No   Sig: Take 1 tablet (325 mg) by mouth daily   celecoxib (CELEBREX) 200 MG capsule   No No   Sig: Take 1 capsule (200 mg) by mouth daily Do not take within 6 hours of ibuprofen (MOTRIN, ADVIL) or ketorolac (TORADOL) if prescribed.   cetirizine (ZYRTEC) 10 MG tablet  Self Yes No   Sig: Take 10 mg by mouth daily   diphenhydrAMINE (BENADRYL) 25 MG tablet  Self Yes No   Sig: Take 25 mg by mouth At Bedtime   esomeprazole (NEXIUM) 40 MG DR capsule  Self Yes No   Sig: Take 40 mg by mouth 2 times daily   ferrous sulfate (FEROSUL) 325 (65 Fe) MG tablet  Self Yes No   Sig: Take 325 mg by mouth 2 times daily   fish oil-omega-3 fatty acids 1000 MG capsule  Self Yes No   Sig: Take 1 g by mouth daily   fluticasone (FLONASE) 50 MCG/ACT nasal spray  Self Yes No   Sig: Spray 2 sprays into both nostrils daily as needed   multivitamin w/minerals (THERA-VIT-M) tablet  Self Yes No   Sig: Take 1 tablet by mouth daily   oxyCODONE (ROXICODONE) 5 MG tablet   No No   Sig: Take 1 tablet (5 mg) by mouth every 4  hours as needed for moderate to severe pain   polyethylene glycol (MIRALAX) 17 GM/Dose powder  Self Yes No   Sig: Take 1 capful by mouth daily   polyethylene glycol-propylene glycol (SYSTANE ULTRA) 0.4-0.3 % SOLN ophthalmic solution  Self Yes No   Sig: Place 1 drop into both eyes 2 times daily   senna-docusate (SENOKOT-S/PERICOLACE) 8.6-50 MG tablet   No No   Sig: Take 1-2 tablets by mouth 2 times daily Take while on oral narcotics to prevent or treat constipation.   vitamin D3 (CHOLECALCIFEROL) 50 mcg (2000 units) tablet  Self Yes No   Sig: Take 1 tablet by mouth daily      Facility-Administered Medications: None        Review of Systems    The 10 point Review of Systems is negative other than noted in the HPI or here.     Social History   I have reviewed this patient's social history and updated it with pertinent information if needed.  Social History     Tobacco Use    Smoking status: Former     Current packs/day: 0.00     Types: Cigarettes     Quit date: 1980     Years since quittin.5    Smokeless tobacco: Never   Vaping Use    Vaping status: Never Used   Substance Use Topics    Alcohol use: Yes     Comment: 1 drink a day    Drug use: Never         Family History   No significant family history reported      Allergies   No Known Allergies     Physical Exam   Vital Signs: Temp: 97.3  F (36.3  C) Temp src: Temporal BP: 132/71 Pulse: 60   Resp: 12 SpO2: 96 % O2 Device: None (Room air)    Weight: 195 lbs 12.3 oz    Constitutional: awake, alert, and cooperative  Eyes: pupils equal, round and reactive to light and conjunctiva normal  ENT: normocephalic, without obvious abnormality, atraumatic  Respiratory: no increased work of breathing, good air exchange, and clear to auscultation, no crackles or wheezing  Cardiovascular: regular rate and rhythm, no murmur noted, and no edema  GI: mild epigastric discomfort to palpation, active bowel sounds  Skin: pale appearing, no bleeding or significant bruising  noted  Neurologic: alert, interactive, no focal deficits      65 MINUTES SPENT BY ME on the date of service doing chart review, history, exam, documentation & further activities per the note.      Data   ------------------------- PAST 24 HR DATA REVIEWED -----------------------------------------------    I have personally reviewed the following data over the past 24 hrs:    5.7  \   8.5 (L)   / 338     129 (L) 98 12.5 /  96   4.7 21 (L) 1.02 \       Imaging results reviewed over the past 24 hrs:   No results found for this or any previous visit (from the past 24 hours).

## 2024-10-24 NOTE — ED NOTES
"[facility]  ED Nurse Handoff Report    ED Chief complaint: Dizziness  . ED Diagnosis:   Final diagnoses:   UGIB (upper gastrointestinal bleed)   Anemia due to blood loss, acute       Allergies: No Known Allergies    Code Status: Full Code    Activity level - Baseline/Home:  independent.  Activity Level - Current:   assist of 1.   Lift room needed: No.   Bariatric: No   Needed: No   Isolation: No.   Infection: Not Applicable.     Respiratory status: Room air    Vital Signs (within 30 minutes):   Vitals:    10/24/24 1446 10/24/24 1501 10/24/24 1531 10/24/24 1546   BP:  132/76 132/71    Pulse: 64 60 59 61   Resp: 12   11   Temp:       TempSrc:       SpO2: 98%   98%   Weight:       Height:           Cardiac Rhythm:  ,   Cardiac  Cardiac Rhythm: Normal sinus rhythm  Pain level:    Patient confused: No.   Patient Falls Risk: nonskid shoes/slippers when out of bed, patient and family education, and assistive device/personal items within reach.   Elimination Status: Has voided     Patient Report - Initial Complaint: hem 8.1 on Tues, C/o lightheadedness. No active bleeding.   Focused Assessment:  Elvis Abad is a 79 year old male with history of anemia, GERD, and GI bleed who presents for concerns of low hemoglobin. Patient reports he has been dizzy, lightheaded, nauseated, cold, and very weak. Chest feels heavy and short of breath. Symptoms have been persistent for past 6 weeks but worsened today. Patient's wife states he has been confused, \"foggy\", and \"can't focus\". She also reports he has not been eating much lately and expresses fear of possibility of falls. No LOC. Patient had a GI bleed in 2022 but states this experience is worse. He is on iron supplements so is he is unsure if stool color has changed. He recently started Nexium.        Abnormal Results:   Labs Ordered and Resulted from Time of ED Arrival to Time of ED Departure   BASIC METABOLIC PANEL - Abnormal       Result " Value    Sodium 129 (*)     Potassium 4.7      Chloride 98      Carbon Dioxide (CO2) 21 (*)     Anion Gap 10      Urea Nitrogen 12.5      Creatinine 1.02      GFR Estimate 74      Calcium 8.5 (*)     Glucose 96     CBC WITH PLATELETS AND DIFFERENTIAL - Abnormal    WBC Count 5.7      RBC Count 3.44 (*)     Hemoglobin 8.5 (*)     Hematocrit 28.8 (*)     MCV 84      MCH 24.7 (*)     MCHC 29.5 (*)     RDW 14.1      Platelet Count 338      % Neutrophils 57      % Lymphocytes 22      % Monocytes 13      % Eosinophils 8      % Basophils 1      % Immature Granulocytes 0      NRBCs per 100 WBC 0      Absolute Neutrophils 3.2      Absolute Lymphocytes 1.2      Absolute Monocytes 0.7      Absolute Eosinophils 0.4      Absolute Basophils 0.0      Absolute Immature Granulocytes 0.0      Absolute NRBCs 0.0     TYPE AND SCREEN, ADULT    ABO/RH(D) O POS      Antibody Screen Negative      SPECIMEN EXPIRATION DATE [id number]     ABO/RH TYPE AND SCREEN        No orders to display       Treatments provided: n/a  Family Comments: wife at bedside  OBS brochure/video discussed/provided to patient:  N/A  ED Medications:   Medications   pantoprazole (PROTONIX) IV push injection 40 mg (40 mg Intravenous $Given 10/24/24 4959)       Drips infusing:  No  For the majority of the shift this patient was Green.   Interventions performed were n/a.    Sepsis treatment initiated: No    Cares/treatment/interventions/medications to be completed following ED care: n/a    ED Nurse Name: William Tavares RN  4:14 PM  RECEIVING UNIT ED HANDOFF REVIEW    Above ED Nurse Handoff Report was reviewed: Yes  Reviewed by: July Grimes RN on October 24, 2024 at 4:24 PM   JACQUIE Echeverria called the ED to inform them the note was read: No

## 2024-10-24 NOTE — PROGRESS NOTES
10/24/24 1701   Skin   Skin WDL WDL   Device Skin Interventions Taken No adjustments needed     Admission/Transfer from: ED  2 RN skin assessment completed. Yes  Significant findings include: None  LDA added (if applicable)? NO  Requested WOC Nurse Consult from MD? NO

## 2024-10-25 ENCOUNTER — ANESTHESIA (OUTPATIENT)
Dept: SURGERY | Facility: CLINIC | Age: 79
DRG: 378 | End: 2024-10-25
Payer: COMMERCIAL

## 2024-10-25 ENCOUNTER — ANESTHESIA EVENT (OUTPATIENT)
Dept: SURGERY | Facility: CLINIC | Age: 79
DRG: 378 | End: 2024-10-25
Payer: COMMERCIAL

## 2024-10-25 LAB
ANION GAP SERPL CALCULATED.3IONS-SCNC: 10 MMOL/L (ref 7–15)
BUN SERPL-MCNC: 13.4 MG/DL (ref 8–23)
CALCIUM SERPL-MCNC: 8.4 MG/DL (ref 8.8–10.4)
CHLORIDE SERPL-SCNC: 100 MMOL/L (ref 98–107)
CREAT SERPL-MCNC: 1.07 MG/DL (ref 0.67–1.17)
EGFRCR SERPLBLD CKD-EPI 2021: 70 ML/MIN/1.73M2
ERYTHROCYTE [DISTWIDTH] IN BLOOD BY AUTOMATED COUNT: 13.8 % (ref 10–15)
GLUCOSE BLDC GLUCOMTR-MCNC: 89 MG/DL (ref 70–99)
GLUCOSE SERPL-MCNC: 91 MG/DL (ref 70–99)
HCO3 SERPL-SCNC: 21 MMOL/L (ref 22–29)
HCT VFR BLD AUTO: 29.7 % (ref 40–53)
HGB BLD-MCNC: 9.1 G/DL (ref 13.3–17.7)
INR PPP: 1.14 (ref 0.85–1.15)
IRON BINDING CAPACITY (ROCHE): 435 UG/DL (ref 240–430)
IRON SATN MFR SERPL: 3 % (ref 15–46)
IRON SERPL-MCNC: 12 UG/DL (ref 61–157)
MCH RBC QN AUTO: 25.6 PG (ref 26.5–33)
MCHC RBC AUTO-ENTMCNC: 30.6 G/DL (ref 31.5–36.5)
MCV RBC AUTO: 83 FL (ref 78–100)
PLATELET # BLD AUTO: 300 10E3/UL (ref 150–450)
POTASSIUM SERPL-SCNC: 4.3 MMOL/L (ref 3.4–5.3)
RBC # BLD AUTO: 3.56 10E6/UL (ref 4.4–5.9)
SODIUM SERPL-SCNC: 131 MMOL/L (ref 135–145)
UPPER GI ENDOSCOPY: NORMAL
WBC # BLD AUTO: 4.7 10E3/UL (ref 4–11)

## 2024-10-25 PROCEDURE — 250N000009 HC RX 250: Performed by: NURSE ANESTHETIST, CERTIFIED REGISTERED

## 2024-10-25 PROCEDURE — 258N000003 HC RX IP 258 OP 636: Performed by: INTERNAL MEDICINE

## 2024-10-25 PROCEDURE — 85610 PROTHROMBIN TIME: CPT | Performed by: HOSPITALIST

## 2024-10-25 PROCEDURE — 80048 BASIC METABOLIC PNL TOTAL CA: CPT | Performed by: HOSPITALIST

## 2024-10-25 PROCEDURE — 83550 IRON BINDING TEST: CPT | Performed by: INTERNAL MEDICINE

## 2024-10-25 PROCEDURE — 120N000001 HC R&B MED SURG/OB

## 2024-10-25 PROCEDURE — 710N000009 HC RECOVERY PHASE 1, LEVEL 1, PER MIN: Performed by: INTERNAL MEDICINE

## 2024-10-25 PROCEDURE — 36415 COLL VENOUS BLD VENIPUNCTURE: CPT | Performed by: INTERNAL MEDICINE

## 2024-10-25 PROCEDURE — 250N000011 HC RX IP 250 OP 636: Performed by: INTERNAL MEDICINE

## 2024-10-25 PROCEDURE — 0DJ08ZZ INSPECTION OF UPPER INTESTINAL TRACT, VIA NATURAL OR ARTIFICIAL OPENING ENDOSCOPIC: ICD-10-PCS | Performed by: INTERNAL MEDICINE

## 2024-10-25 PROCEDURE — 82310 ASSAY OF CALCIUM: CPT | Performed by: HOSPITALIST

## 2024-10-25 PROCEDURE — 99232 SBSQ HOSP IP/OBS MODERATE 35: CPT | Performed by: INTERNAL MEDICINE

## 2024-10-25 PROCEDURE — 999N000141 HC STATISTIC PRE-PROCEDURE NURSING ASSESSMENT: Performed by: INTERNAL MEDICINE

## 2024-10-25 PROCEDURE — 36415 COLL VENOUS BLD VENIPUNCTURE: CPT | Performed by: HOSPITALIST

## 2024-10-25 PROCEDURE — 258N000003 HC RX IP 258 OP 636: Performed by: NURSE ANESTHETIST, CERTIFIED REGISTERED

## 2024-10-25 PROCEDURE — 250N000009 HC RX 250: Performed by: HOSPITALIST

## 2024-10-25 PROCEDURE — 370N000017 HC ANESTHESIA TECHNICAL FEE, PER MIN: Performed by: INTERNAL MEDICINE

## 2024-10-25 PROCEDURE — 272N000001 HC OR GENERAL SUPPLY STERILE: Performed by: INTERNAL MEDICINE

## 2024-10-25 PROCEDURE — 360N000076 HC SURGERY LEVEL 3, PER MIN: Performed by: INTERNAL MEDICINE

## 2024-10-25 PROCEDURE — 82728 ASSAY OF FERRITIN: CPT | Performed by: INTERNAL MEDICINE

## 2024-10-25 PROCEDURE — 250N000011 HC RX IP 250 OP 636: Performed by: NURSE ANESTHETIST, CERTIFIED REGISTERED

## 2024-10-25 PROCEDURE — 85014 HEMATOCRIT: CPT | Performed by: HOSPITALIST

## 2024-10-25 PROCEDURE — 250N000013 HC RX MED GY IP 250 OP 250 PS 637: Performed by: HOSPITALIST

## 2024-10-25 RX ORDER — LIDOCAINE HYDROCHLORIDE 10 MG/ML
INJECTION, SOLUTION INFILTRATION; PERINEURAL PRN
Status: DISCONTINUED | OUTPATIENT
Start: 2024-10-25 | End: 2024-10-25

## 2024-10-25 RX ORDER — LIDOCAINE 40 MG/G
CREAM TOPICAL
Status: DISCONTINUED | OUTPATIENT
Start: 2024-10-25 | End: 2024-10-25 | Stop reason: HOSPADM

## 2024-10-25 RX ORDER — PROPOFOL 10 MG/ML
INJECTION, EMULSION INTRAVENOUS PRN
Status: DISCONTINUED | OUTPATIENT
Start: 2024-10-25 | End: 2024-10-25

## 2024-10-25 RX ORDER — ONDANSETRON 4 MG/1
4 TABLET, ORALLY DISINTEGRATING ORAL EVERY 30 MIN PRN
Status: DISCONTINUED | OUTPATIENT
Start: 2024-10-25 | End: 2024-10-25 | Stop reason: HOSPADM

## 2024-10-25 RX ORDER — NALOXONE HYDROCHLORIDE 0.4 MG/ML
0.1 INJECTION, SOLUTION INTRAMUSCULAR; INTRAVENOUS; SUBCUTANEOUS
Status: DISCONTINUED | OUTPATIENT
Start: 2024-10-25 | End: 2024-10-25 | Stop reason: HOSPADM

## 2024-10-25 RX ORDER — DEXAMETHASONE SODIUM PHOSPHATE 4 MG/ML
INJECTION, SOLUTION INTRA-ARTICULAR; INTRALESIONAL; INTRAMUSCULAR; INTRAVENOUS; SOFT TISSUE PRN
Status: DISCONTINUED | OUTPATIENT
Start: 2024-10-25 | End: 2024-10-25

## 2024-10-25 RX ORDER — METHYLPREDNISOLONE SODIUM SUCCINATE 125 MG/2ML
125 INJECTION INTRAMUSCULAR; INTRAVENOUS
Status: DISCONTINUED | OUTPATIENT
Start: 2024-10-25 | End: 2024-10-26 | Stop reason: HOSPADM

## 2024-10-25 RX ORDER — ONDANSETRON 2 MG/ML
INJECTION INTRAMUSCULAR; INTRAVENOUS PRN
Status: DISCONTINUED | OUTPATIENT
Start: 2024-10-25 | End: 2024-10-25

## 2024-10-25 RX ORDER — ONDANSETRON 2 MG/ML
4 INJECTION INTRAMUSCULAR; INTRAVENOUS EVERY 30 MIN PRN
Status: DISCONTINUED | OUTPATIENT
Start: 2024-10-25 | End: 2024-10-25 | Stop reason: HOSPADM

## 2024-10-25 RX ORDER — DIPHENHYDRAMINE HYDROCHLORIDE 50 MG/ML
50 INJECTION INTRAMUSCULAR; INTRAVENOUS
Status: DISCONTINUED | OUTPATIENT
Start: 2024-10-25 | End: 2024-10-26 | Stop reason: HOSPADM

## 2024-10-25 RX ORDER — ACETAMINOPHEN 325 MG/1
975 TABLET ORAL
Status: DISCONTINUED | OUTPATIENT
Start: 2024-10-25 | End: 2024-10-25 | Stop reason: HOSPADM

## 2024-10-25 RX ORDER — PROPOFOL 10 MG/ML
INJECTION, EMULSION INTRAVENOUS CONTINUOUS PRN
Status: DISCONTINUED | OUTPATIENT
Start: 2024-10-25 | End: 2024-10-25

## 2024-10-25 RX ORDER — SODIUM CHLORIDE, SODIUM LACTATE, POTASSIUM CHLORIDE, CALCIUM CHLORIDE 600; 310; 30; 20 MG/100ML; MG/100ML; MG/100ML; MG/100ML
INJECTION, SOLUTION INTRAVENOUS CONTINUOUS
Status: DISCONTINUED | OUTPATIENT
Start: 2024-10-25 | End: 2024-10-25 | Stop reason: HOSPADM

## 2024-10-25 RX ORDER — DEXAMETHASONE SODIUM PHOSPHATE 4 MG/ML
4 INJECTION, SOLUTION INTRA-ARTICULAR; INTRALESIONAL; INTRAMUSCULAR; INTRAVENOUS; SOFT TISSUE
Status: DISCONTINUED | OUTPATIENT
Start: 2024-10-25 | End: 2024-10-25 | Stop reason: HOSPADM

## 2024-10-25 RX ORDER — SODIUM CHLORIDE, SODIUM LACTATE, POTASSIUM CHLORIDE, CALCIUM CHLORIDE 600; 310; 30; 20 MG/100ML; MG/100ML; MG/100ML; MG/100ML
INJECTION, SOLUTION INTRAVENOUS CONTINUOUS PRN
Status: DISCONTINUED | OUTPATIENT
Start: 2024-10-25 | End: 2024-10-25

## 2024-10-25 RX ADMIN — PROPOFOL 40 MG: 10 INJECTION, EMULSION INTRAVENOUS at 15:27

## 2024-10-25 RX ADMIN — LIDOCAINE HYDROCHLORIDE 30 MG: 10 INJECTION, SOLUTION INFILTRATION; PERINEURAL at 15:27

## 2024-10-25 RX ADMIN — PROPOFOL 100 MCG/KG/MIN: 10 INJECTION, EMULSION INTRAVENOUS at 15:26

## 2024-10-25 RX ADMIN — PANTOPRAZOLE SODIUM 40 MG: 40 INJECTION, POWDER, FOR SOLUTION INTRAVENOUS at 21:13

## 2024-10-25 RX ADMIN — ONDANSETRON 4 MG: 2 INJECTION INTRAMUSCULAR; INTRAVENOUS at 15:25

## 2024-10-25 RX ADMIN — PROPOFOL 20 MG: 10 INJECTION, EMULSION INTRAVENOUS at 15:28

## 2024-10-25 RX ADMIN — IRON SUCROSE 300 MG: 20 INJECTION, SOLUTION INTRAVENOUS at 19:39

## 2024-10-25 RX ADMIN — FERROUS SULFATE TAB 325 MG (65 MG ELEMENTAL FE) 325 MG: 325 (65 FE) TAB at 21:14

## 2024-10-25 RX ADMIN — ACETAMINOPHEN 325MG 650 MG: 325 TABLET ORAL at 06:19

## 2024-10-25 RX ADMIN — DEXAMETHASONE SODIUM PHOSPHATE 4 MG: 4 INJECTION, SOLUTION INTRA-ARTICULAR; INTRALESIONAL; INTRAMUSCULAR; INTRAVENOUS; SOFT TISSUE at 15:25

## 2024-10-25 RX ADMIN — ACETAMINOPHEN 325MG 650 MG: 325 TABLET ORAL at 16:32

## 2024-10-25 RX ADMIN — SODIUM CHLORIDE, POTASSIUM CHLORIDE, SODIUM LACTATE AND CALCIUM CHLORIDE: 600; 310; 30; 20 INJECTION, SOLUTION INTRAVENOUS at 15:15

## 2024-10-25 RX ADMIN — PANTOPRAZOLE SODIUM 40 MG: 40 INJECTION, POWDER, FOR SOLUTION INTRAVENOUS at 09:17

## 2024-10-25 ASSESSMENT — LIFESTYLE VARIABLES: TOBACCO_USE: 1

## 2024-10-25 NOTE — PLAN OF CARE
"End of Shift Summary  For vital signs and complete assessments, please see documentation flowsheets.     Pertinent assessments: A&O x4. VSS on RA. Denies N/V, SOB. Reported a headache, prn tylenol given x1.  [place]. Up SBA to bathroom. Reporting some dizziness when standing. Tolerating a regular diet, NPO at midnight for possible intervention. PIV SL. Stool sample still needed.     Major Shift Events: 1 unit PRBC finished infusing    Treatment Plan: Monitor Hgb and sodium, NPO at midnight, GI consult, monitor stools    Bedside Nurse: Clary Padilla RN           Goal Outcome Evaluation:      Plan of Care Reviewed With: patient    Overall Patient Progress: improvingOverall Patient Progress: improving    Outcome Evaluation: 1 unit PRBC infused, feeling better              Problem: Adult Inpatient Plan of Care  Goal: Plan of Care Review  Description: The Plan of Care Review/Shift note should be completed every shift.  The Outcome Evaluation is a brief statement about your assessment that the patient is improving, declining, or no change.  This information will be displayed automatically on your shift  note.  Outcome: Progressing  Flowsheets (Taken 10/25/2024 0554)  Outcome Evaluation: 1 unit PRBC infused, feeling better  Plan of Care Reviewed With: patient  Overall Patient Progress: improving  Goal: Patient-Specific Goal (Individualized)  Description: You can add care plan individualizations to a care plan. Examples of Individualization might be:  \"Parent requests to be called daily at 9am for status\", \"I have a hard time hearing out of my right ear\", or \"Do not touch me to wake me up as it startles  me\".  Outcome: Progressing  Goal: Absence of Hospital-Acquired Illness or Injury  Outcome: Progressing  Intervention: Identify and Manage Fall Risk  Recent Flowsheet Documentation  Taken 10/24/2024 2013 by Clary Padilla, RN  Safety Promotion/Fall Prevention:   activity supervised   assistive device/personal items " within reach   clutter free environment maintained   nonskid shoes/slippers when out of bed   patient and family education   room near nurse's station   room organization consistent   safety round/check completed   supervised activity  Intervention: Prevent Skin Injury  Recent Flowsheet Documentation  Taken 10/24/2024 2013 by Clary Padilla RN  Body Position: position changed independently  Skin Protection: adhesive use limited  Intervention: Prevent and Manage VTE (Venous Thromboembolism) Risk  Recent Flowsheet Documentation  Taken 10/24/2024 2013 by Clary Padilla RN  VTE Prevention/Management: SCDs off (sequential compression devices)  Intervention: Prevent Infection  Recent Flowsheet Documentation  Taken 10/24/2024 2013 by Clary Padilla RN  Infection Prevention:   cohorting utilized   hand hygiene promoted   rest/sleep promoted   single patient room provided  Goal: Optimal Comfort and Wellbeing  Outcome: Progressing  Intervention: Monitor Pain and Promote Comfort  Recent Flowsheet Documentation  Taken 10/24/2024 2013 by Clary Padilla RN  Pain Management Interventions: declines  Goal: Readiness for Transition of Care  Outcome: Progressing     Problem: Skin Injury Risk Increased  Goal: Skin Health and Integrity  Outcome: Progressing  Intervention: Optimize Skin Protection  Recent Flowsheet Documentation  Taken 10/24/2024 2013 by Clary Padilla RN  Pressure Reduction Techniques: frequent weight shift encouraged  Skin Protection: adhesive use limited  Activity Management: activity adjusted per tolerance  Head of Bed (HOB) Positioning: HOB at 20-30 degrees     Problem: Anemia  Goal: Anemia Symptom Improvement  Outcome: Progressing  Intervention: Monitor and Manage Anemia  Recent Flowsheet Documentation  Taken 10/24/2024 2013 by Clary Padilla RN  Safety Promotion/Fall Prevention:   activity supervised   assistive device/personal items within reach   clutter free environment  maintained   nonskid shoes/slippers when out of bed   patient and family education   room near nurse's station   room organization consistent   safety round/check completed   supervised activity     Problem: Gastrointestinal Bleeding  Goal: Optimal Coping with Acute Illness  Outcome: Progressing  Intervention: Optimize Psychosocial Response  Recent Flowsheet Documentation  Taken 10/24/2024 2013 by Clary Padilla, RN  Supportive Measures:   active listening utilized   decision-making supported   self-care encouraged   verbalization of feelings encouraged  Goal: Hemostasis  Outcome: Progressing  Intervention: Manage Gastrointestinal Bleeding  Recent Flowsheet Documentation  Taken 10/24/2024 2013 by Clary Padilla, RN  Environmental Support:   calm environment promoted   environmental consistency promoted   personal routine supported   rest periods encouraged

## 2024-10-25 NOTE — PLAN OF CARE
"End of Shift Summary  For vital signs and complete assessments, please see documentation flowsheets.     Pertinent assessments:   Pt A&Ox4. VSS on RA. Denies N/V, SOB, dizziness. Mild HA relieved w/ rest. Up SBA to bathroom/chair. No Bm this shift.    Major Shift Events: EGD done. IV iron ordered, awaiting iron lab draw prior to starting.     Treatment Plan: Monitor Hgb, bowel function. IV Protonix and IV iron. GI following.       Problem: Adult Inpatient Plan of Care  Goal: Plan of Care Review  Description: The Plan of Care Review/Shift note should be completed every shift.  The Outcome Evaluation is a brief statement about your assessment that the patient is improving, declining, or no change.  This information will be displayed automatically on your shift  note.  Outcome: Progressing  Flowsheets (Taken 10/25/2024 1823)  Outcome Evaluation: Tolerating regular diet after EGD. Denies pain, dizziness.  Plan of Care Reviewed With: patient  Overall Patient Progress: improving  Goal: Patient-Specific Goal (Individualized)  Description: You can add care plan individualizations to a care plan. Examples of Individualization might be:  \"Parent requests to be called daily at 9am for status\", \"I have a hard time hearing out of my right ear\", or \"Do not touch me to wake me up as it startles  me\".  Outcome: Progressing  Goal: Absence of Hospital-Acquired Illness or Injury  Outcome: Progressing  Intervention: Identify and Manage Fall Risk  Recent Flowsheet Documentation  Taken 10/25/2024 0917 by Hyun Clark RN  Safety Promotion/Fall Prevention:   activity supervised   assistive device/personal items within reach   clutter free environment maintained   nonskid shoes/slippers when out of bed   patient and family education   room near nurse's station   room organization consistent   safety round/check completed   supervised activity  Intervention: Prevent Skin Injury  Recent Flowsheet Documentation  Taken 10/25/2024 0917 by " Hyun Clark RN  Body Position: position changed independently  Goal: Optimal Comfort and Wellbeing  Outcome: Progressing  Intervention: Monitor Pain and Promote Comfort  Recent Flowsheet Documentation  Taken 10/25/2024 1242 by Hyun Clark RN  Pain Management Interventions:   repositioned   rest   cold applied  Taken 10/25/2024 1157 by Hyun Clark RN  Pain Management Interventions: rest  Goal: Readiness for Transition of Care  Outcome: Progressing     Problem: Skin Injury Risk Increased  Goal: Skin Health and Integrity  Outcome: Progressing  Intervention: Optimize Skin Protection  Recent Flowsheet Documentation  Taken 10/25/2024 0917 by Hyun Clark RN  Head of Bed (HOB) Positioning: HOB at 20-30 degrees     Problem: Anemia  Goal: Anemia Symptom Improvement  Outcome: Progressing  Intervention: Monitor and Manage Anemia  Recent Flowsheet Documentation  Taken 10/25/2024 0917 by Hyun Clark RN  Safety Promotion/Fall Prevention:   activity supervised   assistive device/personal items within reach   clutter free environment maintained   nonskid shoes/slippers when out of bed   patient and family education   room near nurse's station   room organization consistent   safety round/check completed   supervised activity     Problem: Gastrointestinal Bleeding  Goal: Optimal Coping with Acute Illness  Outcome: Progressing  Goal: Hemostasis  Outcome: Progressing   Goal Outcome Evaluation:      Plan of Care Reviewed With: patient    Overall Patient Progress: improvingOverall Patient Progress: improving    Outcome Evaluation: Tolerating regular diet after EGD. Denies pain, dizziness.

## 2024-10-25 NOTE — CONSULTS
GASTROENTEROLOGY CONSULTATION      Elvis Abad  [address] [facility] [id number]  79 year old male     Admission Date/Time: 10/24/2024  Primary Care Provider: Otf May     We were asked to see the patient in consultation by Dr. Umanzor for evaluation of symptomatic anemia.    CC: SOB, dyspnea     HPI:  Elvis Abad is a 79 year old male with past medical history significant for anemia, TIA on aspirin, large hiatal hernia/Lars ulcers, gastritis, hemorrhoids, GERD, admitted 10/24 with symptoms of shortness of breath, exertional dyspnea, orthostasis, dizziness and decline in baseline hemoglobin concerning for symptomatic anemia, possible upper GI bleed.    Patient had evaluation in 2022 for iron deficiency anemia.  EGD/colonoscopy 8/2022 with findings of large hiatal hernia with a few Lars ulcers that were superficial,  possibly source of anemia, tortuous esophagus, gastritis with gastric biopsy with reactive gastropathy and normal duodenal/esophageal biopsies, external and internal hemorrhoids (grade 3) with otherwise normal colon and terminal ileum.  Follow-up video capsule study was negative for source with findings of erythematous mucosa in the distal stomach and proximal duodenum, solitary red spot.  Following workup, patient was placed on oral iron and hemoglobin remained steady in the 13 range.  He has continued to take Nexium daily but PCP increased to twice daily earlier this week.    Patient reports about 6 weeks ago, he began noticing shortness of breath, dyspnea on exertion, dizziness, headaches. He has had mild nausea/dyspepsia symptoms but denies abdominal pain, vomiting, hematemesis. Stools have been dark since placed on iron and he thinks they may have been darker over the last several weeks but not black/tarry. He denies hematochezia. He takes naproxen, no more than 2 pills a couple times a week as needed and aspirin 325mg. He denies any other NSAIDs. No blood  thinners.    Lab work 10/24 showed CBC with hemoglobin 8.5, otherwise unremarkable, BMP with sodium 129, otherwise unremarkable.  INR from today 1.14.      PAST MEDICAL HISTORY:  Patient Active Problem List    Diagnosis Date Noted    Anemia due to blood loss, acute 10/24/2024     Priority: Medium    UGIB (upper gastrointestinal bleed) 10/24/2024     Priority: Medium        ROS: A comprehensive ten point review of systems was negative aside from those in mentioned in the HPI.       MEDICATIONS:   Prior to Admission medications    Medication Sig Start Date End Date Taking? Authorizing Provider   acetaminophen (TYLENOL) 325 MG tablet Take 2 tablets (650 mg) by mouth every 4 hours as needed for other (mild pain) 10/4/22  Yes Lonny Deng PA-C   aspirin (ASA) 325 MG EC tablet Take 1 tablet (325 mg) by mouth daily 10/4/22  Yes Lonny Deng PA-C   cetirizine (ZYRTEC) 10 MG tablet Take 10 mg by mouth daily   Yes Reported, Patient   diphenhydrAMINE (BENADRYL) 25 MG tablet Take 25 mg by mouth At Bedtime   Yes Reported, Patient   esomeprazole (NEXIUM) 40 MG DR capsule Take 40 mg by mouth 2 times daily 9/27/21  Yes Reported, Patient   ferrous sulfate (FEROSUL) 325 (65 Fe) MG tablet Take 325 mg by mouth 2 times daily   Yes Reported, Patient   fish oil-omega-3 fatty acids 1000 MG capsule Take 1 g by mouth daily   Yes Reported, Patient   fluticasone (FLONASE) 50 MCG/ACT nasal spray Spray 2 sprays into both nostrils daily as needed   Yes Reported, Patient   multivitamin w/minerals (THERA-VIT-M) tablet Take 1 tablet by mouth daily   Yes Reported, Patient   polyethylene glycol (MIRALAX) 17 GM/Dose powder Take 1 Capful by mouth daily.   Yes Reported, Patient   polyethylene glycol-propylene glycol (SYSTANE ULTRA) 0.4-0.3 % SOLN ophthalmic solution Place 1 drop into both eyes 2 times daily   Yes Reported, Patient   rosuvastatin (CRESTOR) 10 MG tablet Take 10 mg by mouth at bedtime. 7/13/23  Yes Unknown, Entered By History  "  vitamin D3 (CHOLECALCIFEROL) 50 mcg (2000 units) tablet Take 1 tablet by mouth daily   Yes Reported, Patient        ALLERGIES: No Known Allergies     SOCIAL HISTORY:  Social History     Tobacco Use    Smoking status: Former     Current packs/day: 0.00     Types: Cigarettes     Quit date: 1980     Years since quittin.5    Smokeless tobacco: Never   Vaping Use    Vaping status: Never Used   Substance Use Topics    Alcohol use: Yes     Comment: 1 drink a day    Drug use: Never        FAMILY HISTORY:  No family history on file.     PHYSICAL EXAM:   /70 (BP Location: Right arm)   Pulse 65   Temp 98.2  F (36.8  C) (Oral)   Resp 18   Ht 1.753 m (5' 9\")   Wt 87.1 kg (192 lb 0.3 oz)   SpO2 97%   BMI 28.36 kg/m       PHYSICAL EXAM:  General: alert, oriented, NAD  SKIN: no suspicious lesions, rashes, jaundice, or spider angiomas  HEAD: Normocephalic. No masses, lesions, tenderness or abnormalities  NECK: Neck supple. No adenopathy. Thyroid symmetric, normal size.  EYES: No scleral icterus  ENT: ENT exam normal, no neck nodes or sinus tenderness  RESPIRATORY: negative, Good diaphragmatic excursion. Lungs clear  CARDIOVASCULAR: negative, PMI normal. No lifts, heaves, or thrills. RRR. No murmurs, clicks gallops or rub  GASTROINTESTINAL: +BS, soft, NT, ND, no HSM, no masses/guarding/rebound  JOINT/EXTREMITIES: extremities normal- no gross deformities noted, gait normal and normal muscle tone  NEURO: Reflexes grossly normal and symmetric. Sensation grossly WNL.  PSYCH: no abnormal anxiety/depression  LYMPH: No anterior cervical, posterior cervical, or supraclavicular adenopathy     LABS:  I reviewed the patient's new clinical lab test results.   Recent Labs   Lab Test 10/25/24  0610 10/24/24  1317 10/05/22  0724 22  1200   WBC 4.7 5.7  --  6.8   HGB 9.1* 8.5* 9.3* 13.6   MCV 83 84  --  93    338  --  242   INR 1.14  --   --   --      Recent Labs   Lab Test 10/25/24  0610 10/24/24  1317 " 04/11/22  1200   * 129* 129*   POTASSIUM 4.3 4.7 4.8   CHLORIDE 100 98 97   CO2 21* 21* 28   BUN 13.4 12.5 14   ANIONGAP 10 10 4   ROSARIO 8.4* 8.5* 8.5     Recent Labs   Lab Test 04/11/22  1248 04/11/22  1200   ALBUMIN  --  3.6   BILITOTAL  --  0.5   ALT  --  25   AST  --  18   ALKPHOS  --  49   PROTEIN Negative  --         IMAGING  None.    ENDO:  8/29/22 Colonoscopy (iron deficiency anemia):   - The examined portion of the ileum was normal.        - External and internal hemorrhoids.        - The entire examined colon is normal.        - No specimens collected.     9/29/22 EGD (iron deficiency anemia, dysphagia)   - Normal examined duodenum. Biopsied.   - Large hiatal hernia with a few Lars ulcers. These are superficial.  They may or may not be the source of anemia   - Tortuous esophagus.   - Erythematous mucosa in the antrum. Biopsied.     Path - mid/distal esophageal biopsies normal. Gastric biopys reactive gastropathy, H pylori negative. Duodenal biopsy normal.    CONSULTATION ASSESSMENT AND PLAN:    79 year old male with past medical history significant for anemia, TIA on aspirin, large hiatal hernia/Lars ulcers, gastritis, hemorrhoids, GERD, admitted 10/24 with symptoms of shortness of breath, exertional dyspnea, orthostasis, dizziness and decline in baseline hemoglobin concerning for symptomatic anemia, possible upper GI bleed.    1. Symptomatic anemia. HGB 8.5 on admission down from more recent baseline 13. Has dark stools on oral iron therefore difficult to know if clearly upper GI bleed. Suspect occult chronic blood loss related to lars ulcers. BUN is normal arguing against acute upper GI bleed. Has had workup in August 2022 (EGD/colon, Pillcam) for the same issue with findings of large hiatal hernia with lars ulcers (most likely source of anemia at that time) and remains on full strength aspirin increasing risk for ulcer disease.    Plan:  --EGD today.   --Monitor HGB and transfuse prn.    --IV PPI BID.   --Hold ASA.  --Avoid NSAIDs.   --NPO.     Discussed with Dr. Bliss.    Total time spent: 45 minutes was spent providing patient care, including patient evaluation, reviewing documentation/test results, and . Thank you for asking us to participate in the care of this patient.    Gladys Sosa, PAC  [facility] ([facility])

## 2024-10-25 NOTE — ANESTHESIA CARE TRANSFER NOTE
Patient: Elvis Abad    Procedure: Procedure(s):  ESOPHAGOGASTRODUODENOSCOPY       Diagnosis: UGIB (upper gastrointestinal bleed) [K92.2]  Anemia due to blood loss, acute [D62]  Diagnosis Additional Information: No value filed.    Anesthesia Type:   MAC     Note:    Oropharynx: spontaneously breathing  Level of Consciousness: awake  Oxygen Supplementation: face mask    Independent Airway: airway patency satisfactory and stable  Dentition: dentition unchanged  Vital Signs Stable: post-procedure vital signs reviewed and stable  Report to RN Given: handoff report given  Patient transferred to: Phase II    Handoff Report: Identifed the Patient, Identified the Reponsible Provider, Reviewed the pertinent medical history, Discussed the surgical course, Reviewed Intra-OP anesthesia mangement and issues during anesthesia, Set expectations for post-procedure period and Allowed opportunity for questions and acknowledgement of understanding      Vitals:  Vitals Value Taken Time   /76 10/25/24 1542   Temp     Pulse 78 10/25/24 1542   Resp     SpO2 100 % 10/25/24 1544   Vitals shown include unfiled device data.    Electronically Signed By: DOUGLAS Rolon CRNA  October 25, 2024  3:44 PM

## 2024-10-25 NOTE — PROGRESS NOTES
Spoke with the hospitalist.  Ordered Venofer IV.  Iron studies on blood taken before transfusion also ordered.    GI will sign off.  Please call us with any questions or concerns

## 2024-10-25 NOTE — PROGRESS NOTES
[facility]    Hospitalist Progress Note  Name: Elvis Abad    MRN: [id number]  Provider: Alena Gregorio MD  Date of Service: 10/25/2024    Assessment & Plan   Summary of Stay: Elvis Abad is a 79 year old male who was admitted on 10/24/2024 for symptomatic anemia.  Patient's past medical history significant for anemia, large hiatal hernia with Lars ulcers, gastritis, hemorrhoids, TIA, GERD who was sent to the hospital from GI clinic with symptomatic anemia and suspected GI bleed.    Symptomatic anemia   Suspected GI bleeding  Hx large hiatal hernia w/lars ulcers, gastritis, hemorrhoids, GERD  -seen by GI in 2022 and and findings on EGD, colonoscopy and pill cam as noted above. Was on nexium and iron supplements with Hgb recovering to 13 range. However over past 3-6 weeks has had increasing sob, exertional dyspnea, orthostasis, dizziness and was found to have Hgb of 8.1 yesterday. He is on  for TIA and takes celebrex, drinks one alcoholic beverage daily. . Takes iron so stools always dark. PCP had doubled nexium earlier this week with no change.  -Went to [facility] and was sent to ED. In ED Hgb is 8.5 but remains very symptomatic  -suspect has subacute to chronic bleed related to underlying GI findings above and NSAIDS  -hold NSAIDS and place on IV protonix 40 BID, FOBT, serial labs  -consult GI and keep NPO after midnight  -discussed with patient and will transfuse 1 unit PRBC's due to severity of symptoms  -EGD planned for today     HX TIA  -hold ASA, may benefit from discussing with neuro baby ASA instead of full     Hyponatremia  -129, ? Poor intake. serial labs      DVT Prophylaxis: Pneumatic Compression Devices  Code Status: Full Code    Disposition: Expected discharge in 1 to 2 days pending EGD and GI workup      Interval History   Assumed care reviewed chart.  Feels better after transfusion.  Denies any diarrhea abdominal pain nausea or vomiting.  Had similar episode of low   hemoglobin years ago.  More than 10 point review of system was carried out was otherwise negative.  Total time spent in direct patient care coronation of care is more than 40 minutes    -Data reviewed today: I reviewed all new labs and imaging reports over the last 24 hours. I personally reviewed no images or EKG's today.    Physical Exam   Temp: 98.2  F (36.8  C) Temp src: Oral BP: 115/70 Pulse: 65   Resp: 18 SpO2: 97 % O2 Device: None (Room air)    Vitals:    10/24/24 1159 10/24/24 1654   Weight: 88.8 kg (195 lb 12.3 oz) 87.1 kg (192 lb 0.3 oz)     Vital Signs with Ranges  Temp:  [97.3  F (36.3  C)-98.7  F (37.1  C)] 98.2  F (36.8  C)  Pulse:  [55-81] 65  Resp:  [11-18] 18  BP: (115-149)/(61-99) 115/70  SpO2:  [94 %-100 %] 97 %  I/O last 3 completed shifts:  In: 793 [P.O.:340]  Out: 700 [Urine:700]      GEN:  Alert, oriented x 3, appears comfortable, NAD.  HEENT:  Normocephalic/atraumatic, no scleral icterus, no nasal discharge, mouth moist.  CV:  Regular rate and rhythm, no murmur or JVD.  S1 + S2 noted, no S3 or S4.  LUNGS:  Clear to auscultation bilaterally without rales/rhonchi/wheezing/retractions.  Symmetric chest rise on inhalation noted.  ABD:  Active bowel sounds, soft, non-tender/non-distended.  No rebound/guarding/rigidity.  EXT:  No edema.  No cyanosis.  No joint synovitis noted.  SKIN:  Dry to touch, no exanthems noted in the visualized areas.    Medications   Current Facility-Administered Medications   Medication Dose Route Frequency Provider Last Rate Last Admin     Current Facility-Administered Medications   Medication Dose Route Frequency Provider Last Rate Last Admin    ferrous sulfate (FEROSUL) tablet 325 mg  325 mg Oral BID Noé, Edward A, DO   325 mg at 10/24/24 2112    multivitamin w/minerals (THERA-VIT-M) tablet 1 tablet  1 tablet Oral Daily Noé, Edward A, DO        pantoprazole (PROTONIX) IV push injection 40 mg  40 mg Intravenous BID Edward Umanzor DO   40 mg at 10/25/24 0917     "sodium chloride (PF) 0.9% PF flush 3 mL  3 mL Intracatheter Q8H Keena Umanzorjomar BAI, DO   3 mL at 10/25/24 0917     Data     Recent Labs   Lab 10/25/24  0610 10/24/24  1317   WBC 4.7 5.7   HGB 9.1* 8.5*   HCT 29.7* 28.8*   MCV 83 84    338     Recent Labs   Lab 10/25/24  0610 10/24/24  1317   * 129*   POTASSIUM 4.3 4.7   CHLORIDE 100 98   CO2 21* 21*   ANIONGAP 10 10   GLC 91 96   BUN 13.4 12.5   CR 1.07 1.02   GFRESTIMATED 70 74   ROSARIO 8.4* 8.5*     7-Day Micro Results       No results found for the last 168 hours.          No results for input(s): \"NTBNPI\", \"NTBNP\" in the last 168 hours.  Recent Labs   Lab 10/25/24  0610 10/24/24  1317   GLC 91 96     Recent Labs   Lab 10/25/24  0610 10/24/24  1317   HGB 9.1* 8.5*     No results for input(s): \"AST\", \"ALT\", \"GGT\", \"ALKPHOS\", \"BILITOTAL\", \"BILICONJ\", \"BILIDIRECT\", \"JUD\" in the last 168 hours.    Invalid input(s): \"BILIRUBININDIRECT\"  Recent Labs   Lab 10/25/24  0610   INR 1.14     No results for input(s): \"LACT\" in the last 168 hours.  No results for input(s): \"LIPASE\" in the last 168 hours.  No results for input(s): \"TROPONIN\", \"TROPI\", \"TROPR\", \"TROPONINIS\" in the last 168 hours.    Invalid input(s): \"TROPT\", \"TROP\", \"TROPONINIES\", \"TNIH\"  No results for input(s): \"COLOR\", \"APPEARANCE\", \"URINEGLC\", \"URINEBILI\", \"URINEKETONE\", \"SG\", \"UBLD\", \"URINEPH\", \"PROTEIN\", \"UROBILINOGEN\", \"NITRITE\", \"LEUKEST\", \"RBCU\", \"WBCU\" in the last 168 hours.    No results found for this or any previous visit (from the past 24 hours).       "

## 2024-10-25 NOTE — ANESTHESIA PREPROCEDURE EVALUATION
Anesthesia Pre-Procedure Evaluation    Patient: Elvis Abad   MRN: [id number] : 1945        Procedure : Procedure(s):  ESOPHAGOGASTRODUODENOSCOPY          Past Medical History:   Diagnosis Date    Anemia     Gastroesophageal reflux disease     TIA (transient ischemic attack)     Vitamin D deficiency       Past Surgical History:   Procedure Laterality Date    APPENDECTOMY      ARTHROPLASTY SHOULDER Right 10/4/2022    Procedure: RIGHT OPEN TOTAL SHOULDER ARTHROPLASTY;  Surgeon: Jarrett Chaidez MD;  Location: [facility]    BACK SURGERY      COLONOSCOPY      EYE SURGERY      cateracts    GENITOURINARY SURGERY      vasectomy      No Known Allergies   Social History     Tobacco Use    Smoking status: Former     Current packs/day: 0.00     Types: Cigarettes     Quit date: 1980     Years since quittin.5    Smokeless tobacco: Never   Substance Use Topics    Alcohol use: Yes     Comment: 1 drink a day      Wt Readings from Last 1 Encounters:   10/24/24 87.1 kg (192 lb 0.3 oz)        Anesthesia Evaluation   Pt has had prior anesthetic. Type: General.    No history of anesthetic complications       ROS/MED HX  ENT/Pulmonary:     (+)                tobacco use, Past use,                    (-) recent URI   Neurologic:     (+)              TIA,                  Cardiovascular:    (-) CAD   METS/Exercise Tolerance:     Hematologic:     (+)      anemia,          Musculoskeletal:       GI/Hepatic: Comment: Hx ulcers and gastritis now with expected acute on chronic GI bleed    (+) GERD, Asymptomatic on medication,    hiatal hernia,              Renal/Genitourinary:  - neg Renal ROS     Endo:  - neg endo ROS     Psychiatric/Substance Use:       Infectious Disease:       Malignancy:       Other:            Physical Exam    Airway        Mallampati: II   TM distance: > 3 FB   Neck ROM: full   Mouth opening: > 3 cm    Respiratory Devices and Support         Dental       (+) Minor Abnormalities - some fillings, tiny  "chips      Cardiovascular   cardiovascular exam normal          Pulmonary   pulmonary exam normal                OUTSIDE LABS:  CBC:   Lab Results   Component Value Date    WBC 4.7 10/25/2024    WBC 5.7 10/24/2024    HGB 9.1 (L) 10/25/2024    HGB 8.5 (L) 10/24/2024    HCT 29.7 (L) 10/25/2024    HCT 28.8 (L) 10/24/2024     10/25/2024     10/24/2024     BMP:   Lab Results   Component Value Date     (L) 10/25/2024     (L) 10/24/2024    POTASSIUM 4.3 10/25/2024    POTASSIUM 4.7 10/24/2024    CHLORIDE 100 10/25/2024    CHLORIDE 98 10/24/2024    CO2 21 (L) 10/25/2024    CO2 21 (L) 10/24/2024    BUN 13.4 10/25/2024    BUN 12.5 10/24/2024    CR 1.07 10/25/2024    CR 1.02 10/24/2024    GLC 91 10/25/2024    GLC 96 10/24/2024     COAGS:   Lab Results   Component Value Date    INR 1.14 10/25/2024     POC: No results found for: \"BGM\", \"HCG\", \"HCGS\"  HEPATIC:   Lab Results   Component Value Date    ALBUMIN 3.6 04/11/2022    PROTTOTAL 7.0 04/11/2022    ALT 25 04/11/2022    AST 18 04/11/2022    ALKPHOS 49 04/11/2022    BILITOTAL 0.5 04/11/2022     OTHER:   Lab Results   Component Value Date    A1C 5.7 (H) 04/11/2022    ROSARIO 8.4 (L) 10/25/2024       Anesthesia Plan    ASA Status:  3    NPO Status:  NPO Appropriate    Anesthesia Type: MAC.     - Reason for MAC: straight local not clinically adequate              Consents    Anesthesia Plan(s) and associated risks, benefits, and realistic alternatives discussed. Questions answered and patient/representative(s) expressed understanding.     - Discussed:     - Discussed with:  Patient      - Extended Intubation/Ventilatory Support Discussed: No.      - Patient is DNR/DNI Status: No     Use of blood products discussed: No .     Postoperative Care    Pain management: IV analgesics.   PONV prophylaxis: Ondansetron (or other 5HT-3)     Comments:               Floridalma Webber MD    I have reviewed the pertinent notes and labs in the chart from the past 30 days " "and (re)examined the patient.  Any updates or changes from those notes are reflected in this note.     # Hyponatremia: Lowest Na = 129 mmol/L in last 2 days, will monitor as appropriate         # Drug Induced Platelet Defect: home medication list includes an antiplatelet medication      # Anemia: based on hgb <11  # Anemia: based on hgb <11       # Overweight: Estimated body mass index is 28.36 kg/m  as calculated from the following:    Height as of this encounter: 1.753 m (5' 9\").    Weight as of this encounter: 87.1 kg (192 lb 0.3 oz).             "

## 2024-10-25 NOTE — ANESTHESIA POSTPROCEDURE EVALUATION
Patient: Elvis Abad    Procedure: Procedure(s):  ESOPHAGOGASTRODUODENOSCOPY       Anesthesia Type:  MAC    Note:  Disposition: Inpatient   Postop Pain Control:    PONV: No   Neuro/Psych: Uneventful            Sign Out: Acceptable/Baseline neuro status   Airway/Respiratory: Uneventful            Sign Out: Acceptable/Baseline resp. status   CV/Hemodynamics: Uneventful            Sign Out: Acceptable CV status; No obvious hypovolemia; No obvious fluid overload   Other NRE:    DID A NON-ROUTINE EVENT OCCUR? No           Last vitals:  Vitals Value Taken Time   /72 10/25/24 1559   Temp 97.9  F (36.6  C) 10/25/24 1559   Pulse 67 10/25/24 1559   Resp 16 10/25/24 1559   SpO2 94 % 10/25/24 1604   Vitals shown include unfiled device data.    Electronically Signed By: Floridalma Webber MD  October 25, 2024  4:10 PM

## 2024-10-26 VITALS
HEART RATE: 71 BPM | HEIGHT: 69 IN | SYSTOLIC BLOOD PRESSURE: 135 MMHG | RESPIRATION RATE: 16 BRPM | TEMPERATURE: 98.9 F | BODY MASS INDEX: 28.44 KG/M2 | OXYGEN SATURATION: 97 % | DIASTOLIC BLOOD PRESSURE: 71 MMHG | WEIGHT: 192.02 LBS

## 2024-10-26 LAB
ANION GAP SERPL CALCULATED.3IONS-SCNC: 14 MMOL/L (ref 7–15)
BUN SERPL-MCNC: 12.4 MG/DL (ref 8–23)
CALCIUM SERPL-MCNC: 8.9 MG/DL (ref 8.8–10.4)
CHLORIDE SERPL-SCNC: 100 MMOL/L (ref 98–107)
CREAT SERPL-MCNC: 0.94 MG/DL (ref 0.67–1.17)
EGFRCR SERPLBLD CKD-EPI 2021: 82 ML/MIN/1.73M2
ERYTHROCYTE [DISTWIDTH] IN BLOOD BY AUTOMATED COUNT: 13.7 % (ref 10–15)
FERRITIN SERPL-MCNC: 15 NG/ML (ref 31–409)
GLUCOSE SERPL-MCNC: 91 MG/DL (ref 70–99)
HCO3 SERPL-SCNC: 21 MMOL/L (ref 22–29)
HCT VFR BLD AUTO: 32.9 % (ref 40–53)
HGB BLD-MCNC: 9.9 G/DL (ref 13.3–17.7)
MCH RBC QN AUTO: 25.2 PG (ref 26.5–33)
MCHC RBC AUTO-ENTMCNC: 30.1 G/DL (ref 31.5–36.5)
MCV RBC AUTO: 84 FL (ref 78–100)
PLATELET # BLD AUTO: 326 10E3/UL (ref 150–450)
POTASSIUM SERPL-SCNC: 4.3 MMOL/L (ref 3.4–5.3)
RBC # BLD AUTO: 3.93 10E6/UL (ref 4.4–5.9)
SODIUM SERPL-SCNC: 135 MMOL/L (ref 135–145)
WBC # BLD AUTO: 8.7 10E3/UL (ref 4–11)

## 2024-10-26 PROCEDURE — 250N000013 HC RX MED GY IP 250 OP 250 PS 637: Performed by: HOSPITALIST

## 2024-10-26 PROCEDURE — 250N000009 HC RX 250: Performed by: HOSPITALIST

## 2024-10-26 PROCEDURE — 99239 HOSP IP/OBS DSCHRG MGMT >30: CPT | Performed by: INTERNAL MEDICINE

## 2024-10-26 PROCEDURE — 36415 COLL VENOUS BLD VENIPUNCTURE: CPT | Performed by: INTERNAL MEDICINE

## 2024-10-26 PROCEDURE — 80048 BASIC METABOLIC PNL TOTAL CA: CPT | Performed by: INTERNAL MEDICINE

## 2024-10-26 PROCEDURE — 85018 HEMOGLOBIN: CPT | Performed by: INTERNAL MEDICINE

## 2024-10-26 RX ORDER — ASPIRIN 325 MG
325 TABLET, DELAYED RELEASE (ENTERIC COATED) ORAL DAILY
Status: SHIPPED
Start: 2024-10-26

## 2024-10-26 RX ORDER — DIPHENHYDRAMINE HCL 25 MG
25 TABLET ORAL AT BEDTIME
Status: SHIPPED
Start: 2024-10-26

## 2024-10-26 RX ADMIN — FERROUS SULFATE TAB 325 MG (65 MG ELEMENTAL FE) 325 MG: 325 (65 FE) TAB at 08:42

## 2024-10-26 RX ADMIN — PANTOPRAZOLE SODIUM 40 MG: 40 INJECTION, POWDER, FOR SOLUTION INTRAVENOUS at 08:42

## 2024-10-26 RX ADMIN — Medication 1 TABLET: at 08:42

## 2024-10-26 ASSESSMENT — ACTIVITIES OF DAILY LIVING (ADL)
ADLS_ACUITY_SCORE: 0

## 2024-10-26 NOTE — PLAN OF CARE
"Goal Outcome Evaluation:    Pertinent assessments: Pt A&O x4. VSS on RA. Denies pain, dizziness. Up SBA. Uses urinal at bedside. No BM overnight. PIV patent, saline locked. Venofer infusion completed.     Major Shift Events: none    Treatment Plan: Monitor Hgb, monitor stool for blood, IV protonix, IV iron. GI following.    Bedside Nurse: Annel Fine RN      Plan of Care Reviewed With: patient    Overall Patient Progress: improvingOverall Patient Progress: improving    Outcome Evaluation: Venofer infusion completed. Denies pain/dizziness. No BM overnight.      Problem: Adult Inpatient Plan of Care  Goal: Plan of Care Review  Description: The Plan of Care Review/Shift note should be completed every shift.  The Outcome Evaluation is a brief statement about your assessment that the patient is improving, declining, or no change.  This information will be displayed automatically on your shift  note.  Outcome: Progressing  Flowsheets (Taken 10/26/2024 0633)  Outcome Evaluation: Venofer infusion completed. Denies pain/dizziness. No BM overnight.  Plan of Care Reviewed With: patient  Overall Patient Progress: improving  Goal: Patient-Specific Goal (Individualized)  Description: You can add care plan individualizations to a care plan. Examples of Individualization might be:  \"Parent requests to be called daily at 9am for status\", \"I have a hard time hearing out of my right ear\", or \"Do not touch me to wake me up as it startles  me\".  Outcome: Progressing  Goal: Absence of Hospital-Acquired Illness or Injury  Outcome: Progressing  Intervention: Identify and Manage Fall Risk  Recent Flowsheet Documentation  Taken 10/25/2024 1942 by Annel Fine, RN  Safety Promotion/Fall Prevention:   activity supervised   assistive device/personal items within reach   clutter free environment maintained   nonskid shoes/slippers when out of bed   safety round/check completed  Intervention: Prevent Skin Injury  Recent Flowsheet " Documentation  Taken 10/25/2024 1942 by Annel Fine, RN  Body Position: position changed independently  Intervention: Prevent and Manage VTE (Venous Thromboembolism) Risk  Recent Flowsheet Documentation  Taken 10/25/2024 1942 by Annel Fine, RN  VTE Prevention/Management: SCDs off (sequential compression devices)  Intervention: Prevent Infection  Recent Flowsheet Documentation  Taken 10/25/2024 1942 by Annel Fine, RN  Infection Prevention:   hand hygiene promoted   single patient room provided  Goal: Optimal Comfort and Wellbeing  Outcome: Progressing  Intervention: Monitor Pain and Promote Comfort  Recent Flowsheet Documentation  Taken 10/25/2024 1942 by Annel Fine RN  Pain Management Interventions: declines  Goal: Readiness for Transition of Care  Outcome: Progressing     Problem: Skin Injury Risk Increased  Goal: Skin Health and Integrity  Outcome: Progressing  Intervention: Optimize Skin Protection  Recent Flowsheet Documentation  Taken 10/25/2024 1942 by Annel Fine, RN  Activity Management: activity adjusted per tolerance  Head of Bed (HOB) Positioning: HOB at 30-45 degrees     Problem: Anemia  Goal: Anemia Symptom Improvement  Outcome: Progressing  Intervention: Monitor and Manage Anemia  Recent Flowsheet Documentation  Taken 10/25/2024 1942 by Annel Fine, RN  Safety Promotion/Fall Prevention:   activity supervised   assistive device/personal items within reach   clutter free environment maintained   nonskid shoes/slippers when out of bed   safety round/check completed     Problem: Gastrointestinal Bleeding  Goal: Optimal Coping with Acute Illness  Outcome: Progressing  Goal: Hemostasis  Outcome: Progressing

## 2024-10-26 NOTE — PLAN OF CARE
End of Shift Summary  For vital signs and complete assessments, please see documentation flowsheets.     Pertinent assessments:   Pt A&Ox4. VSS on RA. Denies pain, N/V, SOB, dizziness. No bm this shift. Up independently.     Discharge instructions given to pt, questions answered.       Problem: Adult Inpatient Plan of Care  Goal: Absence of Hospital-Acquired Illness or Injury  Intervention: Identify and Manage Fall Risk  Recent Flowsheet Documentation  Taken 10/26/2024 0842 by Hyun Clark RN  Safety Promotion/Fall Prevention:   nonskid shoes/slippers when out of bed   patient and family education  Intervention: Prevent Skin Injury  Recent Flowsheet Documentation  Taken 10/26/2024 0842 by Hyun Clark RN  Body Position: position changed independently     Problem: Skin Injury Risk Increased  Goal: Skin Health and Integrity  Intervention: Optimize Skin Protection  Recent Flowsheet Documentation  Taken 10/26/2024 0842 by Hyun Clark RN  Activity Management: activity adjusted per tolerance  Head of Bed (HOB) Positioning: HOB at 30-45 degrees     Problem: Anemia  Goal: Anemia Symptom Improvement  Intervention: Monitor and Manage Anemia  Recent Flowsheet Documentation  Taken 10/26/2024 0842 by Huyn Clark RN  Safety Promotion/Fall Prevention:   nonskid shoes/slippers when out of bed   patient and family education   Goal Outcome Evaluation:      Plan of Care Reviewed With: patient    Overall Patient Progress: improvingOverall Patient Progress: improving    Outcome Evaluation: Tolerating regular diet after EGD. Denies pain, dizziness.

## 2024-10-26 NOTE — CONSULTS
Care Management Discharge Note    Discharge Date: 10/26/2024       Discharge Disposition:  Home    Discharge Services:      Discharge DME:      Discharge Transportation:  Family    Private pay costs discussed: Not applicable    Education Provided on the Discharge Plan: yes   Persons Notified of Discharge Plans: yes  Patient/Family in Agreement with the Plan:  yes    Handoff Referral Completed: No, handoff not indicated or clinically appropriate    Additional Information:  Received a consult to assist with possible scheduling for IV iron infusions. Discussed with MD who had instructed patient to follow up with [facility] or PCP for scheduling. Patient had one dose of Venofer here and will not need another until Monday.  [facility] is not open today to reach to see if they have any open appointments for Monday and [facility] infusion cannot schedule for them.  Discussed with patient and he will plan on contacting his PCP or [facility] to get these infusions scheduled. He has an [facility] PCP so discussed he may get scheduled with one of their infusion departments as well.    Patient will discharge home today.    Alexandria Duron RN BSN OCN  Care Coordinator  [facility]  [phone]

## 2024-10-26 NOTE — DISCHARGE SUMMARY
[facility]  Discharge Summary  Hospitalist      Date of Admission:  10/24/2024  Date of Discharge:  10/26/2024  Provider:  Alena Gregorio MD  Date of Service (when I last saw the patient): 10/26/24      Primary Provider: Otf May          Discharge Diagnosis:     Discharge Diagnoses   Symptomatic anemia  Iron deficiency anemia  Upper GI bleeding, gastritis/erosions  Hiatal hernia  History of TIA  Hyponatremia      Other medical issues:  Past Medical History:   Diagnosis Date    Anemia     Gastroesophageal reflux disease     TIA (transient ischemic attack)     Vitamin D deficiency           History of Present Illness   Elvis Abad is an 79 year old male who presented with increasing sob, exertional dyspnea, orthostasis, dizziness due to to low hemoglobin please see the admission history and physical for full details.    Hospital Course     Elvis Abad was admitted on 10/24/2024. He is a 79 year old male who was admitted  for symptomatic anemia.  Patient's past medical history significant for anemia, large hiatal hernia with Lars ulcers, gastritis, hemorrhoids, TIA, GERD who was sent to the hospital from GI clinic with symptomatic anemia and suspected GI bleeding       The following problems were addressed during his hospitalization:    Symptomatic anemia   Suspected GI bleeding  Hx large hiatal hernia w/lars ulcers, gastritis, hemorrhoids, GERD  -Admitted for symptomatic anemia with symptoms of dyspnea on exertion weakness and dizziness  -Was evaluated by [facility] prior to coming to hospital.  -suspect has subacute to chronic bleed related to underlying GI findings above and NSAIDS  -Upper endoscopy showed some erosions and gastritis.  Appeared to be chronic in nature.  -Patient received 1 unit of PRBCs with appropriate response  -Iron studies showed low iron of 11.  Also received one-time dose of 3 mg of IV Venofer while in hospital.  -Patient will need  further GI workup including colonoscopy and possibly pill endoscopy as outpatient.  -He will need additional doses of IV iron infusion as outpatient.  Needs 2 more infusions of 3 mg every 72 hours to be arranged as outpatient.  Patient will contact [facility] and primary care clinic to arrange for these infusions.  -Patient will need follow-up and repeat hemoglobin in 1 week  -He will continue his PPI  -He will however hold aspirin until recheck of blood work and evaluation and followed by primary care in 1 week.     HX TIA  -hold ASA, for now given possible GI bleed.  Will need repeat check in 1 week and defer to primary care provider to consider ongoing use as aspirin     Hyponatremia  - resolved    Significant Results and Procedures   As noted above  Pending Results   Unresulted Labs Ordered in the Past 30 Days of this Admission       No orders found from 9/24/2024 to 10/25/2024.            Code Status   Full Code       Primary Care Physician   Otf Haney    Physical Exam   Temp: 98.9  F (37.2  C) Temp src: Oral BP: 135/71 Pulse: 71   Resp: 16 SpO2: 97 % O2 Device: None (Room air)    Vitals:    10/24/24 1159 10/24/24 1654   Weight: 88.8 kg (195 lb 12.3 oz) 87.1 kg (192 lb 0.3 oz)     Vital Signs with Ranges  Temp:  [96.3  F (35.7  C)-98.9  F (37.2  C)] 98.9  F (37.2  C)  Pulse:  [65-96] 71  Resp:  [16-17] 16  BP: (111-152)/(64-89) 135/71  SpO2:  [92 %-99 %] 97 %  I/O last 3 completed shifts:  In: 400 [I.V.:400]  Out: 1800 [Urine:1800]    Constitutional: Awake, alert, cooperative, no apparent distress, and appears stated age.  Respiratory: No increased work of breathing, good air exchange, clear to auscultation bilaterally, no crackles or wheezing.  Cardiovascular: Normal apical impulse,normal S1 and S2,   GI: normal bowel sounds, soft, non-distended, non-tender,.      Discharge Disposition   Discharged to home    Consultations This Hospital Stay   GASTROENTEROLOGY IP CONSULT  CARE MANAGEMENT / SOCIAL  WORK IP CONSULT    Time Spent on this Encounter   I, Alena Gregorio MD, personally saw the patient today and spent greater than 30 minutes discharging this patient.    Discharge Orders      Reason for your hospital stay    Please refer to discharge summary.  Briefly admitted for symptomatic anemia was found to have significantly low iron.  Concern for GI bleed and iron deficiency anemia.  Status post EGD.  He did receive one-time dose of IV iron during hospitalization will need additional 2 doses as outpatient infusion.  Patient will also need to follow-up with [facility] with plans for colonoscopy and possible capsule endoscopy.  Patient is advised to call or come with any new or worsening symptoms.  Follow-up with [facility] as recommended     Follow-up and recommended labs and tests     Follow up with primary care provider, Otf Haney, within 7 days for hospital follow- up.  The following labs/tests are recommended: cbc  Patient will need follow-up iron Venofer iv 300 mg every 72 hours x 2.  Patient will contact [facility] and/or primary care clinic to arrange outpatient infusion of iron.  He will need follow-up as outpatient to monitor his iron levels.  He is advised to hold his aspirin for now until reassessed by primary care provider in 1 week with repeat hemoglobin.     Activity    Your activity upon discharge: activity as tolerated     Diet    Follow this diet upon discharge: Current Diet:Orders Placed This Encounter      Regular Diet Adult     Discharge Medications   Current Discharge Medication List        CONTINUE these medications which have CHANGED    Details   aspirin (ASA) 325 MG EC tablet Take 1 tablet (325 mg) by mouth daily.    Comments: HOLD UNTIL SEEN BY PCP in 1 week  Associated Diagnoses: Post-operative state      diphenhydrAMINE (BENADRYL) 25 MG tablet Take 1 tablet (25 mg) by mouth at bedtime.    Comments: Defer to primary prescriber  Associated Diagnoses: UGIB (upper  gastrointestinal bleed); Anemia due to blood loss, acute           CONTINUE these medications which have NOT CHANGED    Details   acetaminophen (TYLENOL) 325 MG tablet Take 2 tablets (650 mg) by mouth every 4 hours as needed for other (mild pain)  Qty: 100 tablet, Refills: 0    Associated Diagnoses: Post-operative state      cetirizine (ZYRTEC) 10 MG tablet Take 10 mg by mouth daily      esomeprazole (NEXIUM) 40 MG DR capsule Take 40 mg by mouth 2 times daily      ferrous sulfate (FEROSUL) 325 (65 Fe) MG tablet Take 325 mg by mouth 2 times daily      fish oil-omega-3 fatty acids 1000 MG capsule Take 1 g by mouth daily      fluticasone (FLONASE) 50 MCG/ACT nasal spray Spray 2 sprays into both nostrils daily as needed      multivitamin w/minerals (THERA-VIT-M) tablet Take 1 tablet by mouth daily      polyethylene glycol (MIRALAX) 17 GM/Dose powder Take 1 Capful by mouth daily.      polyethylene glycol-propylene glycol (SYSTANE ULTRA) 0.4-0.3 % SOLN ophthalmic solution Place 1 drop into both eyes 2 times daily      rosuvastatin (CRESTOR) 10 MG tablet Take 10 mg by mouth at bedtime.      vitamin D3 (CHOLECALCIFEROL) 50 mcg (2000 units) tablet Take 1 tablet by mouth daily           Allergies   No Known Allergies  Data   Most Recent 3 CBC's:  Recent Labs   Lab Test 10/26/24  0718 10/25/24  0610 10/24/24  1317   WBC 8.7 4.7 5.7   HGB 9.9* 9.1* 8.5*   MCV 84 83 84    300 338      Most Recent 3 BMP's:  Recent Labs   Lab Test 10/26/24  0718 10/25/24  1319 10/25/24  0610 10/24/24  1317     --  131* 129*   POTASSIUM 4.3  --  4.3 4.7   CHLORIDE 100  --  100 98   CO2 21*  --  21* 21*   BUN 12.4  --  13.4 12.5   CR 0.94  --  1.07 1.02   ANIONGAP 14  --  10 10   ROSARIO 8.9  --  8.4* 8.5*   GLC 91 89 91 96     Most Recent 2 LFT's:  Recent Labs   Lab Test 04/11/22  1200   AST 18   ALT 25   ALKPHOS 49   BILITOTAL 0.5     Most Recent INR's and Anticoagulation Dosing History:  Anticoagulation Dose History          Latest Ref  Rng & Units 10/25/2024   Recent Dosing and Labs   INR 0.85 - 1.15 1.14       Details                 Most Recent 3 Troponin's:No lab results found.  Most Recent Cholesterol Panel:  Recent Labs   Lab Test 04/11/22  1200   CHOL 172   LDL 95   HDL 61   TRIG 82     Most Recent 6 Bacteria Isolates From Any Culture (See EPIC Reports for Culture Details):No lab results found.  Most Recent TSH, T4 and A1c Labs:  Recent Labs   Lab Test 04/11/22  1200   A1C 5.7*     Results for orders placed or performed during the hospital encounter of 10/04/22   XR Shoulder Right Port G/E 2 Views    Narrative    XR SHOULDER RIGHT PORT G/E 2 VIEWS 10/4/2022 11:14 AM     HISTORY: Status post surgery    COMPARISON: None.      Impression    IMPRESSION: Total shoulder arthroplasty. Postoperative air is present.  Components are well seated. No fractures are identified. Distal  clavicle excision.     JENNA LAND MD         SYSTEM ID:  [id number]           Disclaimer: This note consists of symbols derived from keyboarding, dictation and/or voice recognition software. As a result, there may be errors in the script that have gone undetected. Please consider this when interpreting information found in this chart.

## 2024-12-07 ENCOUNTER — HEALTH MAINTENANCE LETTER (OUTPATIENT)
Age: 79
End: 2024-12-07

## 2024-12-18 ENCOUNTER — APPOINTMENT (OUTPATIENT)
Age: 79
Setting detail: DERMATOLOGY
End: 2024-12-18

## 2024-12-18 DIAGNOSIS — Z85.828 PERSONAL HISTORY OF OTHER MALIGNANT NEOPLASM OF SKIN: ICD-10-CM

## 2024-12-18 DIAGNOSIS — L57.0 ACTINIC KERATOSIS: ICD-10-CM

## 2024-12-18 PROCEDURE — 17000 DESTRUCT PREMALG LESION: CPT

## 2024-12-18 PROCEDURE — ? DIAGNOSIS COMMENT

## 2024-12-18 PROCEDURE — ? OBSERVATION

## 2024-12-18 PROCEDURE — ? LIQUID NITROGEN

## 2024-12-18 PROCEDURE — ? COUNSELING

## 2024-12-18 PROCEDURE — 99213 OFFICE O/P EST LOW 20 MIN: CPT | Mod: 25

## 2024-12-18 ASSESSMENT — LOCATION SIMPLE DESCRIPTION DERM
LOCATION SIMPLE: NECK
LOCATION SIMPLE: LEFT NOSE
LOCATION SIMPLE: RIGHT ZYGOMA

## 2024-12-18 ASSESSMENT — LOCATION ZONE DERM
LOCATION ZONE: FACE
LOCATION ZONE: NOSE
LOCATION ZONE: NECK

## 2024-12-18 ASSESSMENT — LOCATION DETAILED DESCRIPTION DERM
LOCATION DETAILED: LEFT SUPERIOR LATERAL NECK
LOCATION DETAILED: LEFT NASAL ALA
LOCATION DETAILED: RIGHT CENTRAL ZYGOMA

## 2024-12-18 NOTE — HPI: SKIN LESION (ACTINIC KERATOSES)
Is This A New Presentation, Or A Follow-Up?: Follow Up Actinic Keratoses
Additional History: The lesion was a biopsy proven pigmented actinic keratoses on the left nasal ala without recurrence. The patient states that it has healed well and he has no areas of concern.

## 2024-12-18 NOTE — PROCEDURE: OBSERVATION
Body Location Override (Optional - Billing Will Still Be Based On Selected Body Map Location If Applicable): left nasal ala
Detail Level: Detailed
Size Of Lesion In Cm (Optional): 0
Body Location Override (Optional - Billing Will Still Be Based On Selected Body Map Location If Applicable): left upper back, right superior shoulder, left posterior vertex scalp, the vertex scalp, left jawline, right vertex scalp

## 2024-12-18 NOTE — PROCEDURE: DIAGNOSIS COMMENT
Render Risk Assessment In Note?: no
Comment: Biopsy proven pigmented actinic keratoses on left nasal ala
Detail Level: Simple

## 2024-12-18 NOTE — PROCEDURE: LIQUID NITROGEN
Render Note In Bullet Format When Appropriate: No
Show Applicator Variable?: Yes
Detail Level: Detailed
Post-Care Instructions: I reviewed with the patient in detail post-care instructions. Patient is to wear sunprotection, and avoid picking at any of the treated lesions. Pt may apply Vaseline to crusted or scabbing areas.
Number Of Freeze-Thaw Cycles: 1 freeze-thaw cycle
Duration Of Freeze Thaw-Cycle (Seconds): 10
Consent: The patient's consent was obtained including but not limited to risks of crusting, scabbing, blistering, scarring, darker or lighter pigmentary change, recurrence, incomplete removal and infection.

## (undated) DEVICE — DRAPE ARTHROSCOPY SHOULDER BEACHCHAIR 29369

## (undated) DEVICE — SOL NACL 0.9% IRRIG 1000ML BOTTLE 2F7124

## (undated) DEVICE — DRAPE STERI U 1015

## (undated) DEVICE — DRAPE SHEET REV FOLD 3/4 9349

## (undated) DEVICE — SPONGE LAP 18X18" X8435

## (undated) DEVICE — SOL NACL 0.9% INJ 250ML BAG 2B1322Q

## (undated) DEVICE — SU FIBERWIRE 2 38" T-8 NDL  AR-7206

## (undated) DEVICE — BLADE KNIFE SURG 10 371110

## (undated) DEVICE — SU STRATAFIX PDS PLUS 0 CT 45CM SXPP1A406

## (undated) DEVICE — NDL 19GA 1.5"

## (undated) DEVICE — BONE CEMENT MIXEVAC III HI VAC KIT  0206-015-000

## (undated) DEVICE — SUCTION MANIFOLD NEPTUNE 2 SYS 4 PORT 0702-020-000

## (undated) DEVICE — BLADE KNIFE SURG 15 371115

## (undated) DEVICE — SU STRATAFIX MONOCRYL 2-0 SPIRAL SH 20CM SXMP1B409

## (undated) DEVICE — ESU PENCIL W/SMOKE EVAC NEPTUNE STRYKER 0703-046-000

## (undated) DEVICE — SUCTION IRR SYSTEM W/O TIP INTERPULSE HANDPIECE 0210-100-000

## (undated) DEVICE — SU DERMABOND PRINEO 22CM CLR222US

## (undated) DEVICE — LINEN TOWEL PACK X5 5464

## (undated) DEVICE — GLOVE PROTEXIS POWDER FREE 8.5 ORTHOPEDIC 2D73ET85

## (undated) DEVICE — MANIFOLD NEPTUNE 4 PORT 700-20

## (undated) DEVICE — SPONGE PACK VAGINAL 2X36"

## (undated) DEVICE — SYR 03ML LL W/O NDL 309657

## (undated) DEVICE — GLOVE PROTEXIS POWDER FREE 7.5 ORTHOPEDIC 2D73ET75

## (undated) DEVICE — SOL WATER IRRIG 1000ML BOTTLE 2F7114

## (undated) DEVICE — GLOVE SENSICARE PI MICRO PF 8.0 LATEX FREE MSG9680

## (undated) DEVICE — DECANTER BAG 2002S

## (undated) DEVICE — HOOD FLYTE W/PEELAWAY 408-800-100

## (undated) DEVICE — DRAPE IOBAN INCISE 36X23" 6651EZ

## (undated) DEVICE — SOLUTION WOUND CLEANSING 3/4OZ 10% PVP EA-L3011FB-50

## (undated) DEVICE — GLOVE PROTEXIS BLUE W/NEU-THERA 7.5  2D73EB75

## (undated) DEVICE — ESU GROUND PAD UNIVERSAL W/O CORD

## (undated) DEVICE — NDL SPINAL 18GA 3.5" 405184

## (undated) DEVICE — ESU ELEC BLADE 6" COATED E1450-6

## (undated) DEVICE — SU FIBERWIRE 5 CCS-1 BLUE  AR-7211

## (undated) DEVICE — TUBING SUCTION MEDI-VAC SOFT 3/16"X20' N520A

## (undated) DEVICE — SUCTION TIP YANKAUER STR K87

## (undated) DEVICE — KIT SHOULDER POSITIONING BEACH CHAIR ARM HOLDER AR-1644

## (undated) DEVICE — SU ETHICON STRATAFIX SPR PS 3-0 30X30CM SXMP2B412

## (undated) DEVICE — DRSG ABDOMINAL 07 1/2X8" 7197D

## (undated) DEVICE — BAG CLEAR TRASH 1.3M 39X33" P4040C

## (undated) DEVICE — GLOVE PROTEXIS W/NEU-THERA 8.0  2D73TE80

## (undated) DEVICE — PACK SET-UP STD 9102

## (undated) DEVICE — GOWN XLG DISP 9545

## (undated) DEVICE — SU NDL MAYO LG 216701

## (undated) DEVICE — SU NDL MAYO TROCAR MED 217003

## (undated) DEVICE — DEVICE PASSER MED

## (undated) DEVICE — PREP CHLORAPREP 26ML TINTED HI-LITE ORANGE 930815

## (undated) DEVICE — PACK OPEN SHOULDER SOP15OCFSC

## (undated) DEVICE — PAD CHUX UNDERPAD 30X36" P3036C

## (undated) DEVICE — PREP SKIN SCRUB TRAY 4461A

## (undated) RX ORDER — DEXAMETHASONE SODIUM PHOSPHATE 4 MG/ML
INJECTION, SOLUTION INTRA-ARTICULAR; INTRALESIONAL; INTRAMUSCULAR; INTRAVENOUS; SOFT TISSUE
Status: DISPENSED
Start: 2022-10-04

## (undated) RX ORDER — ONDANSETRON 2 MG/ML
INJECTION INTRAMUSCULAR; INTRAVENOUS
Status: DISPENSED
Start: 2024-10-25

## (undated) RX ORDER — HYDROMORPHONE HCL IN WATER/PF 6 MG/30 ML
PATIENT CONTROLLED ANALGESIA SYRINGE INTRAVENOUS
Status: DISPENSED
Start: 2022-10-04

## (undated) RX ORDER — LIDOCAINE HYDROCHLORIDE 20 MG/ML
INJECTION, SOLUTION EPIDURAL; INFILTRATION; INTRACAUDAL; PERINEURAL
Status: DISPENSED
Start: 2022-10-04

## (undated) RX ORDER — CELECOXIB 200 MG/1
CAPSULE ORAL
Status: DISPENSED
Start: 2022-10-04

## (undated) RX ORDER — FENTANYL CITRATE 50 UG/ML
INJECTION, SOLUTION INTRAMUSCULAR; INTRAVENOUS
Status: DISPENSED
Start: 2022-10-04

## (undated) RX ORDER — ACETAMINOPHEN 325 MG/1
TABLET ORAL
Status: DISPENSED
Start: 2022-10-04

## (undated) RX ORDER — PROPOFOL 10 MG/ML
INJECTION, EMULSION INTRAVENOUS
Status: DISPENSED
Start: 2024-10-25

## (undated) RX ORDER — FENTANYL CITRATE 0.05 MG/ML
INJECTION, SOLUTION INTRAMUSCULAR; INTRAVENOUS
Status: DISPENSED
Start: 2022-10-04

## (undated) RX ORDER — TRANEXAMIC ACID 650 MG/1
TABLET ORAL
Status: DISPENSED
Start: 2022-10-04

## (undated) RX ORDER — PROPOFOL 10 MG/ML
INJECTION, EMULSION INTRAVENOUS
Status: DISPENSED
Start: 2022-10-04

## (undated) RX ORDER — ONDANSETRON 2 MG/ML
INJECTION INTRAMUSCULAR; INTRAVENOUS
Status: DISPENSED
Start: 2022-10-04

## (undated) RX ORDER — CEFAZOLIN SODIUM/WATER 2 G/20 ML
SYRINGE (ML) INTRAVENOUS
Status: DISPENSED
Start: 2022-10-04